# Patient Record
Sex: FEMALE | Race: WHITE | NOT HISPANIC OR LATINO | Employment: FULL TIME | URBAN - METROPOLITAN AREA
[De-identification: names, ages, dates, MRNs, and addresses within clinical notes are randomized per-mention and may not be internally consistent; named-entity substitution may affect disease eponyms.]

---

## 2017-01-04 ENCOUNTER — OFFICE VISIT (OUTPATIENT)
Dept: OBGYN | Facility: CLINIC | Age: 32
End: 2017-01-04
Payer: COMMERCIAL

## 2017-01-04 VITALS
SYSTOLIC BLOOD PRESSURE: 116 MMHG | BODY MASS INDEX: 30.76 KG/M2 | HEIGHT: 67 IN | DIASTOLIC BLOOD PRESSURE: 82 MMHG | HEART RATE: 70 BPM | WEIGHT: 196 LBS

## 2017-01-04 DIAGNOSIS — Z97.5 PRESENCE OF INTRAUTERINE CONTRACEPTIVE DEVICE: ICD-10-CM

## 2017-01-04 DIAGNOSIS — E66.811 OBESITY (BMI 30.0-34.9): ICD-10-CM

## 2017-01-04 DIAGNOSIS — R53.82 CHRONIC FATIGUE: ICD-10-CM

## 2017-01-04 DIAGNOSIS — Z01.419 ENCOUNTER FOR GYNECOLOGICAL EXAMINATION WITHOUT ABNORMAL FINDING: Primary | ICD-10-CM

## 2017-01-04 PROCEDURE — 99395 PREV VISIT EST AGE 18-39: CPT | Performed by: OBSTETRICS & GYNECOLOGY

## 2017-01-04 RX ORDER — PHENTERMINE HYDROCHLORIDE 37.5 MG/1
37.5 CAPSULE ORAL EVERY MORNING
Qty: 90 CAPSULE | Refills: 0 | Status: SHIPPED | OUTPATIENT
Start: 2017-01-04 | End: 2017-01-04

## 2017-01-04 RX ORDER — PHENTERMINE HYDROCHLORIDE 37.5 MG/1
37.5 CAPSULE ORAL EVERY MORNING
Qty: 90 CAPSULE | Refills: 0 | Status: SHIPPED | OUTPATIENT
Start: 2017-01-04 | End: 2017-04-08

## 2017-01-04 ASSESSMENT — PATIENT HEALTH QUESTIONNAIRE - PHQ9: 5. POOR APPETITE OR OVEREATING: NOT AT ALL

## 2017-01-04 ASSESSMENT — ANXIETY QUESTIONNAIRES
5. BEING SO RESTLESS THAT IT IS HARD TO SIT STILL: NOT AT ALL
7. FEELING AFRAID AS IF SOMETHING AWFUL MIGHT HAPPEN: NOT AT ALL
6. BECOMING EASILY ANNOYED OR IRRITABLE: NOT AT ALL
GAD7 TOTAL SCORE: 0
1. FEELING NERVOUS, ANXIOUS, OR ON EDGE: NOT AT ALL
2. NOT BEING ABLE TO STOP OR CONTROL WORRYING: NOT AT ALL
IF YOU CHECKED OFF ANY PROBLEMS ON THIS QUESTIONNAIRE, HOW DIFFICULT HAVE THESE PROBLEMS MADE IT FOR YOU TO DO YOUR WORK, TAKE CARE OF THINGS AT HOME, OR GET ALONG WITH OTHER PEOPLE: NOT DIFFICULT AT ALL
3. WORRYING TOO MUCH ABOUT DIFFERENT THINGS: NOT AT ALL

## 2017-01-04 NOTE — MR AVS SNAPSHOT
"              After Visit Summary   1/4/2017    Marivel Jones    MRN: [id number]           Patient Information     Date Of Birth          1985        Visit Information        Provider Department      1/4/2017 1:30 PM Karen Ngo MD [facility]        Today's Diagnoses     Encounter for gynecological examination without abnormal finding [Z01.419]    -  1     Obesity (BMI 30.0-34.9)            Follow-ups after your visit        Your next 10 appointments already scheduled     Apr 03, 2017  1:00 PM   Office Visit with Karen Ngo MD   [facility] ([facility]    [address] [place] [id number] [phone]           Bring a current list of meds and any records pertaining to this visit.  For Physicals, please bring immunization records and any forms needing to be filled out.  Please arrive 10 minutes early to complete paperwork.              Who to contact     If you have questions or need follow up information about today's clinic visit or your schedule please contact [facility] directly at [phone].  Normal or non-critical lab and imaging results will be communicated to you by PatientKeeperhart, letter or phone within 4 business days after the clinic has received the results. If you do not hear from us within 7 days, please contact the clinic through [employer] or phone. If you have a critical or abnormal lab result, we will notify you by phone as soon as possible.  Submit refill requests through [employer] or call your pharmacy and they will forward the refill request to us. Please allow 3 business days for your refill to be completed.          Additional Information About Your Visit        PatientKeeperharBlastbeat Information     [employer] lets you send messages to your doctor, view your test results, renew your prescriptions, schedule appointments and more. To sign up, go to [email or web address] . Click on \"Log " "in\" on the left side of the screen, which will take you to the Welcome page. Then click on \"Sign up Now\" on the right side of the page.     You will be asked to enter the access code listed below, as well as some personal information. Please follow the directions to create your username and password.     Your access code is: [email or web address]  Expires: 2017  2:41 PM     Your access code will  in 90 days. If you need help or a new code, please call your [place] clinic or [phone].        Care EveryWhere ID     This is your Care EveryWhere ID. This could be used by other organizations to access your [facility] medical records  [id number]        Your Vitals Were     Pulse Height BMI (Body Mass Index)             70 5' 6.5\" (1.689 m) 31.16 kg/m2          Blood Pressure from Last 3 Encounters:   17 116/82   16 122/76   16 120/76    Weight from Last 3 Encounters:   17 196 lb (88.905 kg)   16 201 lb (91.173 kg)   16 208 lb (94.348 kg)              Today, you had the following     No orders found for display         Today's Medication Changes          These changes are accurate as of: 17  2:41 PM.  If you have any questions, ask your nurse or doctor.               Start taking these medicines.        Dose/Directions    phentermine 37.5 MG capsule   Used for:  Obesity (BMI 30.0-34.9)   Started by:  Karen Ngo MD        Dose:  37.5 mg   Take 1 capsule (37.5 mg) by mouth every morning   Quantity:  90 capsule   Refills:  0            Where to get your medicines      Some of these will need a paper prescription and others can be bought over the counter.  Ask your nurse if you have questions.     Bring a paper prescription for each of these medications    - phentermine 37.5 MG capsule             Primary Care Provider Office Phone # Fax #    Karen Ngo [phone]       [facility] [address]  [facility] [address]        Thank you!  "    Thank you for choosing [facility]  for your care. Our goal is always to provide you with excellent care. Hearing back from our patients is one way we can continue to improve our services. Please take a few minutes to complete the written survey that you may receive in the mail after your visit with us. Thank you!             Your Updated Medication List - Protect others around you: Learn how to safely use, store and throw away your medicines at www.disposemymeds.org.          This list is accurate as of: 1/4/17  2:41 PM.  Always use your most recent med list.                   Brand Name Dispense Instructions for use    levonorgestrel 20 MCG/24HR IUD    MIRENA    1 each    1 each (20 mcg) by Intrauterine route once       phentermine 37.5 MG capsule     90 capsule    Take 1 capsule (37.5 mg) by mouth every morning

## 2017-01-04 NOTE — PROGRESS NOTES
Marivel is a 31 year old  female who presents for annual exam.     Besides routine health maintenance,  she would like to discuss phentermine.    HPI:  Patient is doing really well. Has been working on weight loss for about a year now and on phentermine that time. Tried to wean off once and not b/c of weight gain but more b/c of chronic fatigue we decided to keep her on it. She is down about 40# or so and feeling great. She was admittedly walking and exercising more a couple months ago and the cold weather and holidays derailed her. However is still eating small portions and healthy choices 90% of the time.  Feels like a whole new person. Focus is great, energy is great, mood is great. Not feeling any side effects and still feels the effects and not plateuing. Really would like to stay on a bit longer yet b/c so worried about her chronic fatigue coming back b/c had it even when had lower weight.  IUD is working great. Hasn't had a period in a couple years until just 2 days ago started spotting a little. Boys are 5 and 3 and really would like one more.  is warming up to the idea. Things she'd like to try in spring. Wondering when to remove IUD if they move forward.      GYNECOLOGIC HISTORY:    No LMP recorded. Patient is not currently having periods (Reason: IUD).  Her current contraception method is: IUD.  She  reports that she has never smoked. She does not have any smokeless tobacco history on file.    Patient is sexually active.  STD testing offered?  Declined  Last PHQ-9 score on record =   PHQ-9 SCORE 2017   Total Score 0     Last GAD7 score on record =   NATACHA-7 SCORE 2017   Total Score 0     Alcohol Score = 1    HEALTH MAINTENANCE:  Cholesterol: 06/19/15   Total= 159, Triglycerides=66, HDL=56, LDL=90, FBS=87, TSH=06/09/15 2.23  Last Mammo: Never, Result: not applicable  Pap: 14 wnl, HPV-  Colonoscopy:  Never, Result: not applicable  Dexa:  Never    Health maintenance updated:   "yes    HISTORY:  Obstetric History       T2      TAB0   SAB0   E0   M0   L2       # Outcome Date GA Lbr Efren/2nd Weight Sex Delivery Anes PTL Lv   2 Term 13 39w3d 02:20 / 01:00 8 lb 14.5 oz (4.04 kg) M Vag-Spont EPI  Y      Name: Case      Apgar1:  9                Apgar5: 9   1 Term 11 39w0d  8 lb 7 oz (3.827 kg) M Vag-Vacuum EPI  Y      Name: Pina          Patient Active Problem List   Diagnosis     Fatigue     Vitamin D deficiency     Morbid obesity, unspecified obesity type (H)     Presence of intrauterine contraceptive device     Obesity (BMI 30.0-34.9)     Past Surgical History   Procedure Laterality Date     [place] teeth        Social History   Substance Use Topics     Smoking status: Never Smoker      Smokeless tobacco: Not on file     Alcohol Use: 0.0 oz/week     0 Standard drinks or equivalent per week      Problem (# of Occurrences) Relation (Name,Age of Onset)    Thyroid Disease (1) Maternal Grandmother            Current Outpatient Prescriptions   Medication Sig     phentermine 37.5 MG capsule Take 1 capsule (37.5 mg) by mouth every morning     levonorgestrel (MIRENA) 20 MCG/24HR IUD 1 each (20 mcg) by Intrauterine route once     No current facility-administered medications for this visit.     No Known Allergies    Past medical, surgical, social and family histories were reviewed and updated in EPIC.    ROS:   12 point review of systems negative other than symptoms noted below.  Musculoskeletal: Joint Pain    EXAM:  /82 mmHg  Pulse 70  Ht 5' 6.5\" (1.689 m)  Wt 196 lb (88.905 kg)  BMI 31.16 kg/m2   BMI: Body mass index is 31.16 kg/(m^2).    PHYSICAL EXAM:  Constitutional:  Appearance: Well nourished, well developed, alert, in no acute distress  Neck:  Lymph Nodes:  No lymphadenopathy present    Thyroid:  Gland size normal, nontender, no nodules or masses present  on palpation  Chest:  Respiratory Effort:  Breathing unlabored  Cardiovascular:    Heart: Auscultation: "  Regular rate, normal rhythm, no murmurs present  Breasts: Inspection of Breasts:  No lymphadenopathy present    Palpation of Breasts and Axillae:  No masses present on palpation, no  breast tenderness    Axillary Lymph Nodes:  No lymphadenopathy present  Gastrointestinal:   Abdominal Examination:  Abdomen nontender to palpation, tone normal without rigidity or guarding, no masses present, umbilicus without lesions   Liver and Spleen:  No hepatomegaly present, liver nontender to palpation    Hernias:  No hernias present  Lymphatic: Lymph Nodes:  No other lymphadenopathy present  Skin:  General Inspection:  No rashes present, no lesions present, no areas of  discoloration    Genitalia and Groin:  No rashes present, no lesions present, no areas of  discoloration, no masses present  Neurologic/Psychiatric:    Mental Status:  Oriented X3     Pelvic Exam:  External Genitalia:     Normal appearance for age, no discharge present, no tenderness present, no inflammatory lesions present, color normal  Vagina:    Normal vaginal vault without central or paravaginal defects, no discharge present, no inflammatory lesions present, no masses present  Bladder:     Nontender to palpation  Urethra:   Urethral Body:  Urethra palpation normal, urethra structural support normal   Urethral Meatus:  No erythema or lesions present  Cervix:     Appearance healthy, no lesions present, nontender to palpation, no bleeding present, string present  Uterus:     Nontender to palpation, no masses present, position anteflexed, mobility: normal  Adnexa:     No adnexal tenderness present, no adnexal masses present  Perineum:     Perineum within normal limits, no evidence of trauma, no rashes or skin lesions present  Anus:     Anus within normal limits, no hemorrhoids present  Inguinal Lymph Nodes:     No lymphadenopathy present  Pubic Hair:     Normal pubic hair distribution for age  Genitalia and Groin:     No rashes present, no lesions present, no  areas of discoloration, no masses present    COUNSELING:   Reviewed preventive health counseling, as reflected in patient instructions  Special attention given to:        Regular exercise       Healthy diet/nutrition       Family planning    BMI: Body mass index is 31.16 kg/(m^2).  Weight management plan: Discussed healthy diet and exercise guidelines and patient will follow up in 3 months in clinic to re-evaluate.    ASSESSMENT:  31 year old female with satisfactory annual exam.    ICD-10-CM    1. Encounter for gynecological examination without abnormal finding [Z01.419] Z01.419    2. Obesity (BMI 30.0-34.9) E66.9 phentermine 37.5 MG capsule     DISCONTINUED: phentermine 37.5 MG capsule   3. Presence of intrauterine contraceptive device Z97.5    4. Chronic fatigue R53.82        PLAN:  Pap is UTD  Will return in 3 months to f/u on phentermine and likely plan to remove IUD at that time as well  Should start PNV  Encouraged to increase exercise b/c will need to stop phentermine to get pregnant and if not doing the right things will be high risk to gain it all back and patient very much aware of that.    Karen Ngo MD

## 2017-01-05 ASSESSMENT — ANXIETY QUESTIONNAIRES: GAD7 TOTAL SCORE: 0

## 2017-01-05 ASSESSMENT — PATIENT HEALTH QUESTIONNAIRE - PHQ9: SUM OF ALL RESPONSES TO PHQ QUESTIONS 1-9: 0

## 2017-04-03 ENCOUNTER — OFFICE VISIT (OUTPATIENT)
Dept: OBGYN | Facility: CLINIC | Age: 32
End: 2017-04-03
Payer: COMMERCIAL

## 2017-04-03 VITALS
HEART RATE: 84 BPM | DIASTOLIC BLOOD PRESSURE: 80 MMHG | SYSTOLIC BLOOD PRESSURE: 120 MMHG | WEIGHT: 188 LBS | BODY MASS INDEX: 29.89 KG/M2

## 2017-04-03 DIAGNOSIS — Z30.432 ENCOUNTER FOR IUD REMOVAL: ICD-10-CM

## 2017-04-03 DIAGNOSIS — E66.3 OVERWEIGHT (BMI 25.0-29.9): ICD-10-CM

## 2017-04-03 DIAGNOSIS — Z31.9 PROCREATIVE MANAGEMENT: Primary | ICD-10-CM

## 2017-04-03 PROCEDURE — 58301 REMOVE INTRAUTERINE DEVICE: CPT | Performed by: OBSTETRICS & GYNECOLOGY

## 2017-04-03 PROCEDURE — 99213 OFFICE O/P EST LOW 20 MIN: CPT | Mod: 25 | Performed by: OBSTETRICS & GYNECOLOGY

## 2017-04-03 NOTE — PROGRESS NOTES
SUBJECTIVE:                                                   Marivel Jones is a 31 year old female who presents to clinic today for the following health issue(s):  Patient presents with:  Recheck Medication          HPI:  Patient was here to f/u on her phentermine. Has been on it for several months with excellent results. Has lost almost 50#. Energy is great. Feeling great, eating healthy, walking and drinking tons of water. No side effects. Really loves how it working and really nervous to stop it but at same time thinks she's ready to get pregnant. Boys are 5 and 3 and really feels like she'd like to move forward. Hates pregnancy and the early baby phase but also just wants to move forward and take out her IUD.  isn't totally on board but knows that he'll go along with her at the end of the day. Really nervous. concieved Kj while on ocps so removing the IUD just seems very real.  Wondering when to stop phentermine, if she should wean off, etc.  Is doing a 37.5mg capsule.    No LMP recorded. Patient is not currently having periods (Reason: IUD)..   Patient is sexually active, .  Using IUD for contraception.    reports that she has never smoked. She does not have any smokeless tobacco history on file.    STD testing offered?  Declined    Health maintenance updated:  no    Today's PHQ-2 Score: No flowsheet data found.  Today's PHQ-9 Score:   PHQ-9 SCORE 2017   Total Score 0     Today's NATACHA-7 Score:   NATACHA-7 SCORE 2017   Total Score 0       Problem list and histories reviewed & adjusted, as indicated.  Additional history: as documented.    Patient Active Problem List   Diagnosis     Fatigue     Vitamin D deficiency     Morbid obesity, unspecified obesity type (H)     Obesity (BMI 30.0-34.9)     Past Surgical History:   Procedure Laterality Date     [place] teeth  2012      Social History   Substance Use Topics     Smoking status: Never Smoker     Smokeless tobacco: Not on file      Alcohol use 0.0 oz/week     0 Standard drinks or equivalent per week      Problem (# of Occurrences) Relation (Name,Age of Onset)    Thyroid Disease (1) Maternal Grandmother            Current Outpatient Prescriptions   Medication Sig     phentermine 37.5 MG capsule Take 1 capsule (37.5 mg) by mouth every morning     levonorgestrel (MIRENA) 20 MCG/24HR IUD 1 each (20 mcg) by Intrauterine route once     No current facility-administered medications for this visit.      No Known Allergies    ROS:  12 point review of systems negative other than symptoms noted below.    OBJECTIVE:     /80  Pulse 84  Wt 188 lb (85.3 kg)  BMI 29.89 kg/m2  Body mass index is 29.89 kg/(m^2).    Exam:  Constitutional:  Appearance: Well nourished, well developed alert, in no acute distress  Chest:  Respiratory Effort:  Breathing unlabored  Cardiovascular: Heart: Auscultation:  Regular rate, normal rhythm, no murmurs present  Pelvic Exam:  External Genitalia:     Normal appearance for age, no discharge present, no tenderness present, no inflammatory lesions present, color normal  Vagina:    Normal vaginal vault without central or paravaginal defects, no discharge present, no inflammatory lesions present, no masses present  Bladder:     Nontender to palpation  Urethra:   Urethral Body:  Urethra palpation normal, urethra structural support normal   Urethral Meatus:  No erythema or lesions present  Cervix:     Appearance healthy, no lesions present, nontender to palpation, no bleeding present, string present  Uterus:     Nontender to palpation, no masses present, position anteflexed, mobility: normal  Adnexa:     No adnexal tenderness present, no adnexal masses present  Perineum:     Perineum within normal limits, no evidence of trauma, no rashes or skin lesions present  Anus:     Anus within normal limits, no hemorrhoids present  Inguinal Lymph Nodes:     No lymphadenopathy present  Pubic Hair:     Normal pubic hair distribution for  age  Genitalia and Groin:     No rashes present, no lesions present, no areas of discoloration, no masses present     In-Clinic Test Results:  No results found for this or any previous visit (from the past 24 hour(s)).    ASSESSMENT/PLAN:                                                        ICD-10-CM    1. Procreative management Z31.9    2. Encounter for IUD removal Z30.432 REMOVE INTRAUTERINE DEVICE   3. Overweight (BMI 25.0-29.9) E66.3          Patient has done great on phentermine and has had continued weight loss while on it and improvement in her day to day patterns and habits to help sustain the loss.  Patient now wants to conceive so needs to stop the medication as not considered safe. Recommended to do every other day for 1 week then every 3 days for one week and then off.  Start PNV.  IUD removed as per procedure note below.    Karen Ngo MD  [facility]  INDICATIONS:                                                      Is a pregnancy test required: No.  Was a consent obtained?  Yes    Marivel Jones is a 31 year old female,, No LMP recorded. Patient is not currently having periods (Reason: IUD). who presents today for IUD removal. Her current IUD was placed 3 yrs ago. She has not had problems with the IUD. She requests removal of the IUD because she desires to conceive    Today's PHQ-2 Score: No flowsheet data found.    PROCEDURE:                                                      A speculum exam was performed and the cervix was visualized. The IUD string was visualized. Using ring forceps, the string  was grasped and the IUD removed intact.    POST PROCEDURE:                                                      The patient tolerated the procedure well. Patient was discharged in stable condition.    Call if bleeding, pain or fever occur. and Pregnancy counseling given, including folic acid supplementation 800-1000 mg per day.    Karen Ngo MD

## 2017-04-03 NOTE — MR AVS SNAPSHOT
"              After Visit Summary   4/3/2017    Marivel Jones    MRN: [id number]           Patient Information     Date Of Birth          1985        Visit Information        Provider Department      4/3/2017 1:00 PM Karen Ngo MD [facility]         Follow-ups after your visit        Who to contact     If you have questions or need follow up information about today's clinic visit or your schedule please contact [facility] directly at [phone].  Normal or non-critical lab and imaging results will be communicated to you by MyChart, letter or phone within 4 business days after the clinic has received the results. If you do not hear from us within 7 days, please contact the clinic through [employer] or phone. If you have a critical or abnormal lab result, we will notify you by phone as soon as possible.  Submit refill requests through [employer] or call your pharmacy and they will forward the refill request to us. Please allow 3 business days for your refill to be completed.          Additional Information About Your Visit        MyChart Information     [employer] lets you send messages to your doctor, view your test results, renew your prescriptions, schedule appointments and more. To sign up, go to [email or web address] . Click on \"Log in\" on the left side of the screen, which will take you to the Welcome page. Then click on \"Sign up Now\" on the right side of the page.     You will be asked to enter the access code listed below, as well as some personal information. Please follow the directions to create your username and password.     Your access code is: [email or web address]  Expires: 2017  3:41 PM     Your access code will  in 90 days. If you need help or a new code, please call your [facility] clinic or [phone].        Care EveryWhere ID     This is your Care EveryWhere ID. This could be used by other organizations to access your [facility] medical " records  [id number]        Your Vitals Were     Pulse BMI (Body Mass Index)                84 29.89 kg/m2           Blood Pressure from Last 3 Encounters:   04/03/17 120/80   01/04/17 116/82   09/26/16 122/76    Weight from Last 3 Encounters:   04/03/17 188 lb (85.3 kg)   01/04/17 196 lb (88.9 kg)   09/26/16 201 lb (91.2 kg)              Today, you had the following     No orders found for display       Primary Care Provider Office Phone # Fax #    Karen Ngo [phone]       [facility] [address] [address]        Thank you!     Thank you for choosing [facility]  for your care. Our goal is always to provide you with excellent care. Hearing back from our patients is one way we can continue to improve our services. Please take a few minutes to complete the written survey that you may receive in the mail after your visit with us. Thank you!             Your Updated Medication List - Protect others around you: Learn how to safely use, store and throw away your medicines at www.disposemymeds.org.          This list is accurate as of: 4/3/17  1:42 PM.  Always use your most recent med list.                   Brand Name Dispense Instructions for use    levonorgestrel 20 MCG/24HR IUD    MIRENA    1 each    1 each (20 mcg) by Intrauterine route once       phentermine 37.5 MG capsule     90 capsule    Take 1 capsule (37.5 mg) by mouth every morning

## 2018-01-26 ENCOUNTER — OFFICE VISIT (OUTPATIENT)
Dept: OBGYN | Facility: CLINIC | Age: 33
End: 2018-01-26
Payer: COMMERCIAL

## 2018-01-26 VITALS
SYSTOLIC BLOOD PRESSURE: 112 MMHG | WEIGHT: 198 LBS | BODY MASS INDEX: 31.08 KG/M2 | HEIGHT: 67 IN | DIASTOLIC BLOOD PRESSURE: 66 MMHG

## 2018-01-26 DIAGNOSIS — Z31.41 FERTILITY TESTING: ICD-10-CM

## 2018-01-26 DIAGNOSIS — Z31.9 PROCREATIVE MANAGEMENT: ICD-10-CM

## 2018-01-26 DIAGNOSIS — E66.811 OBESITY (BMI 30.0-34.9): ICD-10-CM

## 2018-01-26 DIAGNOSIS — Z01.419 ENCOUNTER FOR GYNECOLOGICAL EXAMINATION WITHOUT ABNORMAL FINDING: Primary | ICD-10-CM

## 2018-01-26 PROCEDURE — 99395 PREV VISIT EST AGE 18-39: CPT | Performed by: OBSTETRICS & GYNECOLOGY

## 2018-01-26 ASSESSMENT — ANXIETY QUESTIONNAIRES
2. NOT BEING ABLE TO STOP OR CONTROL WORRYING: NOT AT ALL
IF YOU CHECKED OFF ANY PROBLEMS ON THIS QUESTIONNAIRE, HOW DIFFICULT HAVE THESE PROBLEMS MADE IT FOR YOU TO DO YOUR WORK, TAKE CARE OF THINGS AT HOME, OR GET ALONG WITH OTHER PEOPLE: NOT DIFFICULT AT ALL
6. BECOMING EASILY ANNOYED OR IRRITABLE: NOT AT ALL
1. FEELING NERVOUS, ANXIOUS, OR ON EDGE: NOT AT ALL
7. FEELING AFRAID AS IF SOMETHING AWFUL MIGHT HAPPEN: NOT AT ALL
GAD7 TOTAL SCORE: 0
5. BEING SO RESTLESS THAT IT IS HARD TO SIT STILL: NOT AT ALL
3. WORRYING TOO MUCH ABOUT DIFFERENT THINGS: NOT AT ALL

## 2018-01-26 ASSESSMENT — PATIENT HEALTH QUESTIONNAIRE - PHQ9: 5. POOR APPETITE OR OVEREATING: NOT AT ALL

## 2018-01-26 NOTE — MR AVS SNAPSHOT
"              After Visit Summary   1/26/2018    Marivel Jones    MRN: [id number]           Patient Information     Date Of Birth          1985        Visit Information        Provider Department      1/26/2018 1:00 PM Karen Ngo MD [facility]        Today's Diagnoses     Encounter for gynecological examination without abnormal finding    -  1    Procreative management        Fertility testing        Obesity (BMI 30.0-34.9)           Follow-ups after your visit        Future tests that were ordered for you today     Open Future Orders        Priority Expected Expires Ordered    Anti-Mullerian hormone Routine  2/26/2018 1/26/2018    Follicle stimulating hormone Routine  2/26/2018 1/26/2018    Estradiol Routine  2/26/2018 1/26/2018    Prolactin Routine  2/26/2018 1/26/2018    TSH with free T4 reflex Routine  2/26/2018 1/26/2018            Who to contact     If you have questions or need follow up information about today's clinic visit or your schedule please contact [facility] directly at [phone].  Normal or non-critical lab and imaging results will be communicated to you by Scope 5hart, letter or phone within 4 business days after the clinic has received the results. If you do not hear from us within 7 days, please contact the clinic through Thumbs Upt or phone. If you have a critical or abnormal lab result, we will notify you by phone as soon as possible.  Submit refill requests through [email or web address] or call your pharmacy and they will forward the refill request to us. Please allow 3 business days for your refill to be completed.          Additional Information About Your Visit        Scope 5hart Information     [email or web address] lets you send messages to your doctor, view your test results, renew your prescriptions, schedule appointments and more. To sign up, go to [email or web address] . Click on \"Log in\" on the left side of the screen, which will take you to the " "Welcome page. Then click on \"Sign up Now\" on the right side of the page.     You will be asked to enter the access code listed below, as well as some personal information. Please follow the directions to create your username and password.     Your access code is: [email or web address]  Expires: 2018  6:37 PM     Your access code will  in 90 days. If you need help or a new code, please call your [place] clinic or [phone].        Care EveryWhere ID     This is your Care EveryWhere ID. This could be used by other organizations to access your [place] medical records  [id number]        Your Vitals Were     Height Last Period BMI (Body Mass Index)             5' 6.5\" (1.689 m) 2018 31.48 kg/m2          Blood Pressure from Last 3 Encounters:   18 112/66   17 120/80   17 116/82    Weight from Last 3 Encounters:   18 198 lb (89.8 kg)   17 188 lb (85.3 kg)   17 196 lb (88.9 kg)               Primary Care Provider Office Phone # Fax #    Karen Ngo [phone] [address] [address]        Equal Access to Services     [facility] AH: Hadii aad ku hadasho Soreginaldali, waaxda luqadaha, qaybta kaalmada adeegyada, wendi irvin . So [facility] [phone].    ATENCIÓN: Si habla español, tiene a saba disposición servicios gratuitos de asistencia lingüística. Llame al [phone].    We comply with applicable federal civil rights laws and [place] laws. We do not discriminate on the basis of race, color, national origin, age, disability, sex, sexual orientation, or gender identity.            Thank you!     Thank you for choosing [facility]  for your care. Our goal is always to provide you with excellent care. Hearing back from our patients is one way we can continue to improve our services. Please take a few minutes to complete the written survey that you may receive in the mail after your visit " with us. Thank you!             Your Updated Medication List - Protect others around you: Learn how to safely use, store and throw away your medicines at www.disposemymeds.org.      Notice  As of 1/26/2018  6:37 PM    You have not been prescribed any medications.

## 2018-01-26 NOTE — PROGRESS NOTES
"  Marivel is a 32 year old  female who presents for annual exam.     Besides routine health maintenance, has been trying to get pregnant since IUD removed in April.       HPI:  Patient is doing well. Stopped her phentermine in the summer and was really sad to stop it. Felt great even off of it until about 3 months ago. Then started to feel more fatigued. Still trying to be really careful on what she's eating and portion size. Exercising some. Gained about 10# in the last 9 months since stopping it but still about 40# down from her max and hoping to get working on it more agressively now that holidays done    Still working for [employer].     Boys are 6 and 4.  going well for Pina. Got pregnant with him \"by accident\" and with case did an opk test one time and got pregnant. Now trying since IUD came out in April. Periods are very regular at 27-28 days. Can tell when ovulating and thinks its' about day 13 of cycle. More actively trying since /july but not doing OPKs at all but more cautious of timing I.c appropriately. Is not surprised it's taking longer b/c older but disappointed. May just give herself until turns 33 and if not preg by then may just stop trying at that point.    No other concerns or issues.    The patient does not have a primary care clinic.      GYNECOLOGIC HISTORY:    Patient's last menstrual period was 2018.  Her current contraception method is: none.  She  reports that she has never smoked. She has never used smokeless tobacco.  Patient is sexually active.  STD testing offered?  Declined     Last PHQ-9 score on record =   PHQ-9 SCORE 2018   Total Score 0     Last GAD7 score on record =   NATACHA-7 SCORE 2018   Total Score 0     Alcohol Score = 2    HEALTH MAINTENANCE:  Cholesterol: Triglycerides=66, HDL=56, LDL=90, FBS=87, TSH=2.23  Cholesterol   Date Value Ref Range Status   2015 159 <200 mg/dL Final     Comment:     LDL Cholesterol is the primary guide to " "therapy.   The NCEP recommends further evaluation of: patients with cholesterol greater   than 200 mg/dL if additional risk factors are present, cholesterol greater   than   240 mg/dL, triglycerides greater than 150 mg/dL, or HDL less than 40 mg/dL.     Last Mammo: N/A, Result: not applicable, Next Mammo: due age 38  Pap: 14  Negative, Neg-HPV  Colonoscopy:  N/A, Result: not applicable, Next Colonoscopy: due age 48  Dexa:  Never  Health maintenance updated:  yes    HISTORY:  Obstetric History       T2      L2     SAB0   TAB0   Ectopic0   Multiple0   Live Births2       # Outcome Date GA Lbr Efren/2nd Weight Sex Delivery Anes PTL Lv   2 Term 13 39w3d 02:20 / 01:00 8 lb 14.5 oz (4.04 kg) M Vag-Spont EPI  CHANDNI      Name: Case      Apgar1:  9                Apgar5: 9   1 Term 11 39w0d  8 lb 7 oz (3.827 kg) M Vag-Vacuum EPI  CHANDNI      Name: Pina          Patient Active Problem List   Diagnosis     Fatigue     Vitamin D deficiency     Morbid obesity, unspecified obesity type (H)     Obesity (BMI 30.0-34.9)     Past Surgical History:   Procedure Laterality Date     wisdom teeth  2012      Social History   Substance Use Topics     Smoking status: Never Smoker     Smokeless tobacco: Never Used     Alcohol use 0.0 oz/week     0 Standard drinks or equivalent per week      Problem (# of Occurrences) Relation (Name,Age of Onset)    Thyroid Disease (1) Maternal Grandmother            No current outpatient prescriptions on file.     No current facility-administered medications for this visit.      No Known Allergies    Past medical, surgical, social and family histories were reviewed and updated in EPIC.    ROS:   12 point review of systems negative other than symptoms noted below.    EXAM:  /66  Ht 5' 6.5\" (1.689 m)  Wt 198 lb (89.8 kg)  LMP 2018  BMI 31.48 kg/m2   BMI: Body mass index is 31.48 kg/(m^2).    PHYSICAL EXAM:  Constitutional:  Appearance: Well nourished, well developed, " alert, in no acute distress  Neck:  Lymph Nodes:  No lymphadenopathy present    Thyroid:  Gland size normal, nontender, no nodules or masses present  on palpation  Chest:  Respiratory Effort:  Breathing unlabored  Cardiovascular:    Heart: Auscultation:  Regular rate, normal rhythm, no murmurs present  Breasts: Palpation of Breasts and Axillae:  No masses present on palpation, no breast tenderness. and No nodularity, asymmetry or nipple discharge bilaterally.  Gastrointestinal:   Abdominal Examination:  Abdomen nontender to palpation, tone normal without rigidity or guarding, no masses present, umbilicus without lesions   Liver and Spleen:  No hepatomegaly present, liver nontender to palpation    Hernias:  No hernias present  Lymphatic: Lymph Nodes:  No other lymphadenopathy present  Skin:  General Inspection:  No rashes present, no lesions present, no areas of  discoloration    Genitalia and Groin:  No rashes present, no lesions present, no areas of  discoloration, no masses present  Neurologic/Psychiatric:    Mental Status:  Oriented X3     Pelvic Exam:  External Genitalia:     Normal appearance for age, no discharge present, no tenderness present, no inflammatory lesions present, color normal  Vagina:     Normal vaginal vault without central or paravaginal defects, no discharge present, no inflammatory lesions present, no masses present  Bladder:     Nontender to palpation  Urethra:   Urethral Body:  Urethra palpation normal, urethra structural support normal   Urethral Meatus:  No erythema or lesions present  Cervix:     Appearance healthy, no lesions present, nontender to palpation, no bleeding present  Uterus:     Uterus: firm, normal sized and nontender, anteverted in position.   Adnexa:     No adnexal tenderness present, no adnexal masses present  Perineum:     Perineum within normal limits, no evidence of trauma, no rashes or skin lesions present  Anus:     Anus within normal limits, no hemorrhoids  present  Inguinal Lymph Nodes:     No lymphadenopathy present  Pubic Hair:     Normal pubic hair distribution for age  Genitalia and Groin:     No rashes present, no lesions present, no areas of discoloration, no masses present      COUNSELING:   Reviewed preventive health counseling, as reflected in patient instructions  Special attention given to:        Regular exercise       Healthy diet/nutrition       Family planning    BMI: Body mass index is 31.48 kg/(m^2).  Weight management plan: Discussed healthy diet and exercise guidelines and patient will follow up in 12 months in clinic to re-evaluate.    ASSESSMENT:  32 year old female with satisfactory annual exam.    ICD-10-CM    1. Encounter for gynecological examination without abnormal finding Z01.419    2. Procreative management Z31.9    3. Fertility testing Z31.41 Anti-Mullerian hormone     Follicle stimulating hormone     Estradiol     Prolactin     TSH with free T4 reflex   4. Obesity (BMI 30.0-34.9) E66.9        PLAN:  Pap Is UTD for 2 more years  Patient isn't ready to pursue fertility testing at this point. Will do OPKs again just to make sure that timing it right. Discussed day 3 labs vs just AMH level and she will think about it and if decides to do labs will call to schedule  Discussed weight, portions, exercise, low carbs, etc.    Karen Ngo MD

## 2018-01-27 ASSESSMENT — PATIENT HEALTH QUESTIONNAIRE - PHQ9: SUM OF ALL RESPONSES TO PHQ QUESTIONS 1-9: 0

## 2018-01-27 ASSESSMENT — ANXIETY QUESTIONNAIRES: GAD7 TOTAL SCORE: 0

## 2018-03-26 ENCOUNTER — TELEPHONE (OUTPATIENT)
Dept: OBGYN | Facility: CLINIC | Age: 33
End: 2018-03-26

## 2018-03-26 NOTE — TELEPHONE ENCOUNTER
1st attempt: LMTCB to check if pt is planning on doing fertility testing per AJ last note. Asked pt to call back and let me know if she declines having any testing done.    Extended out labs 2 months just to follow up if pt does not return call

## 2018-03-28 NOTE — TELEPHONE ENCOUNTER
Cancelled labs per pt.    Labs that were ordered were TSH with reflex, Prolactin, Estradiol, FSH, and AMH if pt decides to have done in future.

## 2018-05-15 ENCOUNTER — TELEPHONE (OUTPATIENT)
Dept: NURSING | Facility: CLINIC | Age: 33
End: 2018-05-15

## 2018-05-15 DIAGNOSIS — Z31.41 FERTILITY TESTING: Primary | ICD-10-CM

## 2018-05-15 NOTE — TELEPHONE ENCOUNTER
She can also always do day 3 labs on day 2-5 if that helps avoid needing labs at the hospital when clinics are closed.  Past day 5 isn't as accurate anymore

## 2018-05-15 NOTE — TELEPHONE ENCOUNTER
Called pt to notify that days 2-5 for the labs will work just as well per note from Dr. Ngo below. Pt verbalized understanding and has no other questions at this time.

## 2018-05-15 NOTE — TELEPHONE ENCOUNTER
"I would definitely do all labs on day 3. First day of \"period\" is first day of true red blood. Anything that is brown or black spotting before that doesn't count. Actual red flow. Then we can do tsh, prl, estradiol, fsh, and AMH. Are her periods regular? If they are then I bet she is ovulating and her OPK is just not accurate.  "

## 2018-05-15 NOTE — TELEPHONE ENCOUNTER
"Pt's last office visit 1/26/2018 and discussed with Dr. Ngo of possibly pursuing fertility testing although she wasn't ready at that time but now \"thinking\" about proceeding.  She has been doing OPK's at home and they are showing that she isn't ovulating so this is concerning to her, especially with her age. She isn't sure how to time day 3 labs, as she is never sure when to count the start of her period with spotting at the beginning.   She really wants your opinion on where to start. If there are labs she can do at anytime during her cycle.    Routing to Dr. Ngo, please advise. She is aware we may not get back to her until tomorrow knowing it's your surgery day.    Thank you!    Anum LUNA RN  Triage  "

## 2018-05-15 NOTE — TELEPHONE ENCOUNTER
"Pt calling in,   Reviewed notes with Dr. Ngo' recommendations below  LMP 4/29, periods ARE regular (reviewed that Dr. Ngo believes she is likely ovulating then and OPKs are just not detecting), and she expects next period to start around Memorial Day.  Lab orders placed (futured; TSH w/free T4 reflex, FSH, prolactin, estradiol, AMH)    Pt reports they haven't done any screening/diagnostics on 's sperm; \"he'd be fine with only 2 kids.\" Pt wants to start with testing herself first; didn't expect to have any problems getting pregnant after IUD removal 4/8/17. We reviewed the possible next steps after blood work for fertility testing, pt verbalized understanding. Support and encouragement provided.     Pt will call clinic on the first day of red blood flow of next period for lab-only appt. She is also aware that if day 3 falls on a weekend, she can have blood drawn at [facility] and has number to call their lab for an appt. Pt has no other questions at this time.     Routing to Dr. Ngo as KENDALL            "

## 2018-05-25 DIAGNOSIS — Z31.41 FERTILITY TESTING: Primary | ICD-10-CM

## 2018-05-25 LAB
ESTRADIOL SERPL-MCNC: 47 PG/ML
FSH SERPL-ACNC: 8.4 IU/L
PROLACTIN SERPL-MCNC: 8 UG/L (ref 3–27)
TSH SERPL DL<=0.005 MIU/L-ACNC: 2.31 MU/L (ref 0.4–4)

## 2018-05-25 PROCEDURE — 84146 ASSAY OF PROLACTIN: CPT | Performed by: OBSTETRICS & GYNECOLOGY

## 2018-05-25 PROCEDURE — 83001 ASSAY OF GONADOTROPIN (FSH): CPT | Performed by: OBSTETRICS & GYNECOLOGY

## 2018-05-25 PROCEDURE — 83520 IMMUNOASSAY QUANT NOS NONAB: CPT | Mod: 90 | Performed by: OBSTETRICS & GYNECOLOGY

## 2018-05-25 PROCEDURE — 82670 ASSAY OF TOTAL ESTRADIOL: CPT | Performed by: OBSTETRICS & GYNECOLOGY

## 2018-05-25 PROCEDURE — 99000 SPECIMEN HANDLING OFFICE-LAB: CPT | Performed by: OBSTETRICS & GYNECOLOGY

## 2018-05-25 PROCEDURE — 84443 ASSAY THYROID STIM HORMONE: CPT | Performed by: OBSTETRICS & GYNECOLOGY

## 2018-05-25 PROCEDURE — 36415 COLL VENOUS BLD VENIPUNCTURE: CPT | Performed by: OBSTETRICS & GYNECOLOGY

## 2018-05-27 LAB — MIS SERPL-MCNC: 2.85 NG/ML (ref 0.18–11.71)

## 2018-05-30 ENCOUNTER — TELEPHONE (OUTPATIENT)
Dept: NURSING | Facility: CLINIC | Age: 33
End: 2018-05-30

## 2018-05-30 NOTE — TELEPHONE ENCOUNTER
Karen Ngo MD  P We Triage                   All of her labs look great.   Her AMH is 2.8 which is exactly what it should be. Average for age 30 is 3.0 so she's right on at 2.8 for 32 years old. Anything above 2.5 means good egg counts.   I don't have anything hormonally to explain why they're not pregnant.   Since they've been pregnant twice before I'm not sure what has changed but I do think we should probably have her  come and do a semen analysis b/c that's more likely to be an issue than all the sudden she has blocked tubes. Have her[facility]him arrange that at [facility] and then i'll f/u with her after that       Called pt with detailed phone message regarding the above result note from Dr. Ngo from labs 5/25/18. If pt has questions regarding what was told over the phone, it is in reference to above note and [facility] recommendations.  Anum LUNA RN

## 2018-08-23 ENCOUNTER — TELEPHONE (OUTPATIENT)
Dept: OBGYN | Facility: CLINIC | Age: 33
End: 2018-08-23

## 2018-08-23 NOTE — TELEPHONE ENCOUNTER
LMP: 7/15/18 @5w4d  OV 18 Dr. Ngo      Pt calling with nausea which started last evening with new pregnancy. Has hx with previous pregnancies with nausea and AJ prescribed Zofran. She cannot get in to see her earlier than .  Went over the Vit B6 and Unisom recommendation along with sips of cold fluids, sea band acupressure bands OTC, and trying kelvin root tea and/or peppermint candy.    Pt is willing to try the above recommendations and will call if these are not affective in the next few weeks.    Pt verbalized understanding and no further questions.

## 2018-08-26 ENCOUNTER — NURSE TRIAGE (OUTPATIENT)
Dept: NURSING | Facility: CLINIC | Age: 33
End: 2018-08-26

## 2018-08-26 NOTE — TELEPHONE ENCOUNTER
"Marivel is 6 weeks pregnant.  It's her third pregnancy. She has a four year old and a seven year old. She noticed a small amount of pink on the toilet tissue early this a.m. And then after she awoke for the day there was bright red blood mixed with mucous on the tissue. Nothing in her underwear.  She had intercourse yesterday. She called asking what she should do.  I gave her advice from protocol including reasons to call back. She stated understanding and was in agreement with instructions given. She'll call the clinic in the morning about possibly being seen sooner than her 9/28/2018 already scheduled appointment.    Additional Information    Negative: Shock suspected (e.g., cold/pale/clammy skin, too weak to stand, low BP, rapid pulse)    Negative: Difficult to awaken or acting confused  (e.g., disoriented, slurred speech)    Negative: Passed out (i.e., lost consciousness, collapsed and was not responding)    Negative: Sounds like a life-threatening emergency to the triager    Negative: SEVERE abdominal pain    Negative: [1] SEVERE vaginal bleeding (i.e., soaking 2 pads / hour, large blood clots) AND [2] present 2 or more hours    Negative: [1] MODERATE vaginal bleeding (i.e., soaking 1 pad / hour; clots) AND [2] present > 6 hours    Negative: [1] MODERATE vaginal bleeding (i.e., soaking 1 pad / hour; clots) AND [2] pregnant > 12 weeks    Negative: Passed tissue (e.g., gray-white)    Negative: Shoulder pain    Negative: [1] Constant abdominal pain AND [2] present > 2 hours    Negative: Fever > 100.4 F (38.0 C)    Negative: [1] Intermittent lower abdominal pain (e.g., cramping) AND [2] present > 24 hours    Negative: Prior history of \"ectopic pregnancy\" or previous tubal surgery (e.g., tubal ligation)    Negative: Burning with urination    Negative: Has IUD    Negative: MILD vaginal bleeding (i.e., clots or similar to menstrual period; not just spotting)    Negative: SPOTTING lasts > 48 hours or spotting happens " more than once in a week    Negative: Unusual vaginal discharge (e.g., bad smelling, yellow, green, or foamy-white)    Negative: Not feeling pregnant any longer (e.g., breast tenderness or nausea has disappeared)    SPOTTING after sexual intercourse (single or brief episode) (all triage questions negative)    Protocols used: PREGNANCY - VAGINAL BLEEDING LESS THAN 20 WEEKS EGA-ADULT-  Camilla BENDER RN [facility]

## 2018-08-27 ENCOUNTER — TELEPHONE (OUTPATIENT)
Dept: OBGYN | Facility: CLINIC | Age: 33
End: 2018-08-27

## 2018-08-27 NOTE — TELEPHONE ENCOUNTER
Pt. Currently 6 weeks pregnant, c/o spotting that began this weekend, possibly very mild cramping.

## 2018-08-27 NOTE — TELEPHONE ENCOUNTER
Pt says that she had spoke with triage over the weekend. She noticed so brownish spotting on Saturday nite. Denies any cramping. She did have IC over the weekend. The spotting is not constant.  Discussed that IC can cause spotting/ Discussed that she should just monitor. Drink plenty of fluids. Contact the clinic if her spotting should become heavy flow- changing pad hourly, severe cramping and passing large clots. Pt verbalized understanding.

## 2018-08-28 ENCOUNTER — TELEPHONE (OUTPATIENT)
Dept: OBGYN | Facility: CLINIC | Age: 33
End: 2018-08-28

## 2018-08-28 DIAGNOSIS — O21.9 NAUSEA AND VOMITING DURING PREGNANCY: Primary | ICD-10-CM

## 2018-08-28 RX ORDER — ONDANSETRON 4 MG/1
4 TABLET, FILM COATED ORAL EVERY 8 HOURS PRN
Qty: 30 TABLET | Refills: 1 | Status: SHIPPED | OUTPATIENT
Start: 2018-08-28 | End: 2018-09-18

## 2018-08-28 NOTE — TELEPHONE ENCOUNTER
"LMP: 7/15/18 @ 6w2d    Pt suffering from nausea with early pregnancy  Wakes at 0300, wide awake and starts up with nausea. Gags but does not vomit. Staying hydrated with sips of H2O. Difficult to function during day at work.    Called and spoke with our triage last week and given things to try per algorithm and stated she tried everything with no help. Unisom makes her feel \"crazy\". She states nausea hitting earlier than previous pregnancies.   Hx of using Zofran PO with previous pregnancies with good relief.  Requesting Zofran now. Aware AJ is out of town till next week.  Routing to on call Dr. Dietrich to advise. Will call pt with response.  "

## 2018-08-28 NOTE — TELEPHONE ENCOUNTER
Called and informed pt of Zofran Rx sent to her pharmacy. Instructions given. Pt verbalized understanding and no further questions.

## 2018-09-10 ENCOUNTER — TELEPHONE (OUTPATIENT)
Dept: OBGYN | Facility: CLINIC | Age: 33
End: 2018-09-10

## 2018-09-10 NOTE — TELEPHONE ENCOUNTER
Pt cant see Dr. Ngo until she is 11 weeks and she is wanting to know if she should be scheduling the advanced testing since she is 33 years old.  She had to do a ultrasound and genetic counseling at  because she is advanced maternal age.  She is wanting to know what Dr. Ngo Is recommending her to do so she can keep her appointments on track.  She would like to schedule these appointments sooner than later if she has to do them.

## 2018-09-10 NOTE — TELEPHONE ENCOUNTER
I usually do innatal rather than the other stuff so she shouldn't have to schedule that. Warn her on new cost structure rather than$99 but it's still cheaper than an NT with first tri screen with MFM

## 2018-09-10 NOTE — TELEPHONE ENCOUNTER
Pt returned call. Wanted to go over what Innatal tests for. Reviewed information. Sending Innatal information to pt's home. No further questions.

## 2018-09-18 DIAGNOSIS — O21.9 NAUSEA AND VOMITING DURING PREGNANCY: ICD-10-CM

## 2018-09-18 RX ORDER — ONDANSETRON 4 MG/1
4 TABLET, FILM COATED ORAL EVERY 8 HOURS PRN
Qty: 30 TABLET | Refills: 0 | Status: SHIPPED | OUTPATIENT
Start: 2018-09-18 | End: 2018-09-28

## 2018-09-18 NOTE — TELEPHONE ENCOUNTER
LMP 7/17/18, 9w0d. Pt requesting refill of Zofran for her morning sickness. Is taking every 8 hours during the day. Has only three pills left. Pt is taking fiber supplements for constipation. BM about every other day.     ondansetron (ZOFRAN) 4 MG tablet   Last Written Prescription Date:  8/28/18  Last Fill Quantity: 30,   # refills: 1  Last Office Visit with [facility] primary care provider:  1/26/18  Future Office visit: 9/28/18 [facility]    Routing refill request to provider for review/approval because:  Routing to Dr. Petit, on call. [place] to send refill?

## 2018-09-27 DIAGNOSIS — O36.80X0 PREGNANCY WITH INCONCLUSIVE FETAL VIABILITY: Primary | ICD-10-CM

## 2018-09-28 ENCOUNTER — PRENATAL OFFICE VISIT (OUTPATIENT)
Dept: OBGYN | Facility: CLINIC | Age: 33
End: 2018-09-28
Payer: COMMERCIAL

## 2018-09-28 ENCOUNTER — RADIANT APPOINTMENT (OUTPATIENT)
Dept: ULTRASOUND IMAGING | Facility: CLINIC | Age: 33
End: 2018-09-28
Payer: COMMERCIAL

## 2018-09-28 VITALS
SYSTOLIC BLOOD PRESSURE: 123 MMHG | DIASTOLIC BLOOD PRESSURE: 75 MMHG | HEIGHT: 67 IN | HEART RATE: 77 BPM | BODY MASS INDEX: 32.83 KG/M2 | WEIGHT: 209.2 LBS

## 2018-09-28 DIAGNOSIS — O21.9 NAUSEA AND VOMITING DURING PREGNANCY: ICD-10-CM

## 2018-09-28 DIAGNOSIS — O09.529 SUPERVISION OF HIGH-RISK PREGNANCY OF ELDERLY MULTIGRAVIDA: Primary | ICD-10-CM

## 2018-09-28 DIAGNOSIS — O36.80X0 PREGNANCY WITH INCONCLUSIVE FETAL VIABILITY: ICD-10-CM

## 2018-09-28 DIAGNOSIS — Z13.79 GENETIC SCREENING: ICD-10-CM

## 2018-09-28 DIAGNOSIS — Z23 NEED FOR PROPHYLACTIC VACCINATION AND INOCULATION AGAINST INFLUENZA: ICD-10-CM

## 2018-09-28 DIAGNOSIS — E03.8 SUBCLINICAL HYPOTHYROIDISM: ICD-10-CM

## 2018-09-28 LAB
ABO + RH BLD: NORMAL
ABO + RH BLD: NORMAL
ALBUMIN UR-MCNC: NEGATIVE MG/DL
APPEARANCE UR: CLEAR
BILIRUB UR QL STRIP: NEGATIVE
BLD GP AB SCN SERPL QL: NORMAL
BLOOD BANK CMNT PATIENT-IMP: NORMAL
COLOR UR AUTO: YELLOW
ERYTHROCYTE [DISTWIDTH] IN BLOOD BY AUTOMATED COUNT: 12.9 % (ref 10–15)
GLUCOSE UR STRIP-MCNC: NEGATIVE MG/DL
HCT VFR BLD AUTO: 41.1 % (ref 35–47)
HGB BLD-MCNC: 14.2 G/DL (ref 11.7–15.7)
HGB UR QL STRIP: NEGATIVE
KETONES UR STRIP-MCNC: NEGATIVE MG/DL
LEUKOCYTE ESTERASE UR QL STRIP: NEGATIVE
MCH RBC QN AUTO: 29.8 PG (ref 26.5–33)
MCHC RBC AUTO-ENTMCNC: 34.5 G/DL (ref 31.5–36.5)
MCV RBC AUTO: 86 FL (ref 78–100)
NITRATE UR QL: NEGATIVE
PH UR STRIP: 5.5 PH (ref 5–7)
PLATELET # BLD AUTO: 296 10E9/L (ref 150–450)
RBC # BLD AUTO: 4.77 10E12/L (ref 3.8–5.2)
RUBELLA ABY IGG: NORMAL
SOURCE: NORMAL
SP GR UR STRIP: >1.03 (ref 1–1.03)
SPECIMEN EXP DATE BLD: NORMAL
UROBILINOGEN UR STRIP-ACNC: 0.2 EU/DL (ref 0.2–1)
WBC # BLD AUTO: 10.7 10E9/L (ref 4–11)

## 2018-09-28 PROCEDURE — 90686 IIV4 VACC NO PRSV 0.5 ML IM: CPT | Performed by: OBSTETRICS & GYNECOLOGY

## 2018-09-28 PROCEDURE — 86850 RBC ANTIBODY SCREEN: CPT | Performed by: OBSTETRICS & GYNECOLOGY

## 2018-09-28 PROCEDURE — 40000791 ZZHCL STATISTIC VERIFI PRENATAL TRISOMY 21,18,13: Mod: 90 | Performed by: OBSTETRICS & GYNECOLOGY

## 2018-09-28 PROCEDURE — 99207 ZZC FIRST OB VISIT: CPT | Performed by: OBSTETRICS & GYNECOLOGY

## 2018-09-28 PROCEDURE — 85027 COMPLETE CBC AUTOMATED: CPT | Performed by: OBSTETRICS & GYNECOLOGY

## 2018-09-28 PROCEDURE — 99000 SPECIMEN HANDLING OFFICE-LAB: CPT | Performed by: OBSTETRICS & GYNECOLOGY

## 2018-09-28 PROCEDURE — 84439 ASSAY OF FREE THYROXINE: CPT | Performed by: OBSTETRICS & GYNECOLOGY

## 2018-09-28 PROCEDURE — 76817 TRANSVAGINAL US OBSTETRIC: CPT | Performed by: OBSTETRICS & GYNECOLOGY

## 2018-09-28 PROCEDURE — 87086 URINE CULTURE/COLONY COUNT: CPT | Performed by: OBSTETRICS & GYNECOLOGY

## 2018-09-28 PROCEDURE — 87340 HEPATITIS B SURFACE AG IA: CPT | Performed by: OBSTETRICS & GYNECOLOGY

## 2018-09-28 PROCEDURE — 36415 COLL VENOUS BLD VENIPUNCTURE: CPT | Performed by: OBSTETRICS & GYNECOLOGY

## 2018-09-28 PROCEDURE — 87389 HIV-1 AG W/HIV-1&-2 AB AG IA: CPT | Performed by: OBSTETRICS & GYNECOLOGY

## 2018-09-28 PROCEDURE — 86780 TREPONEMA PALLIDUM: CPT | Performed by: OBSTETRICS & GYNECOLOGY

## 2018-09-28 PROCEDURE — 90471 IMMUNIZATION ADMIN: CPT | Performed by: OBSTETRICS & GYNECOLOGY

## 2018-09-28 PROCEDURE — 86900 BLOOD TYPING SEROLOGIC ABO: CPT | Performed by: OBSTETRICS & GYNECOLOGY

## 2018-09-28 PROCEDURE — 81003 URINALYSIS AUTO W/O SCOPE: CPT | Performed by: OBSTETRICS & GYNECOLOGY

## 2018-09-28 PROCEDURE — 86762 RUBELLA ANTIBODY: CPT | Performed by: OBSTETRICS & GYNECOLOGY

## 2018-09-28 PROCEDURE — 86901 BLOOD TYPING SEROLOGIC RH(D): CPT | Performed by: OBSTETRICS & GYNECOLOGY

## 2018-09-28 PROCEDURE — 84443 ASSAY THYROID STIM HORMONE: CPT | Performed by: OBSTETRICS & GYNECOLOGY

## 2018-09-28 RX ORDER — ONDANSETRON 8 MG/1
8 TABLET, FILM COATED ORAL EVERY 8 HOURS PRN
Qty: 60 TABLET | Refills: 1 | Status: SHIPPED | OUTPATIENT
Start: 2018-09-28 | End: 2018-11-09

## 2018-09-28 ASSESSMENT — ANXIETY QUESTIONNAIRES
IF YOU CHECKED OFF ANY PROBLEMS ON THIS QUESTIONNAIRE, HOW DIFFICULT HAVE THESE PROBLEMS MADE IT FOR YOU TO DO YOUR WORK, TAKE CARE OF THINGS AT HOME, OR GET ALONG WITH OTHER PEOPLE: NOT DIFFICULT AT ALL
3. WORRYING TOO MUCH ABOUT DIFFERENT THINGS: NOT AT ALL
GAD7 TOTAL SCORE: 0
6. BECOMING EASILY ANNOYED OR IRRITABLE: NOT AT ALL
5. BEING SO RESTLESS THAT IT IS HARD TO SIT STILL: NOT AT ALL
2. NOT BEING ABLE TO STOP OR CONTROL WORRYING: NOT AT ALL
1. FEELING NERVOUS, ANXIOUS, OR ON EDGE: NOT AT ALL
7. FEELING AFRAID AS IF SOMETHING AWFUL MIGHT HAPPEN: NOT AT ALL

## 2018-09-28 ASSESSMENT — PATIENT HEALTH QUESTIONNAIRE - PHQ9: 5. POOR APPETITE OR OVEREATING: NOT AT ALL

## 2018-09-28 NOTE — PROGRESS NOTES

## 2018-09-28 NOTE — MR AVS SNAPSHOT
After Visit Summary   9/28/2018    Marivel Jones    MRN: [id number]           Patient Information     Date Of Birth          1985        Visit Information        Provider Department      9/28/2018 1:30 PM Karen Ngo MD; [facility] [place]        Today's Diagnoses     Supervision of high-risk pregnancy of elderly multigravida    -  1    Genetic screening        Need for prophylactic vaccination and inoculation against influenza        Subclinical hypothyroidism        Nausea and vomiting during pregnancy           Follow-ups after your visit        Your next 10 appointments already scheduled     Oct 24, 2018  1:00 PM CDT   ESTABLISHED PRENATAL with Karen Ngo MD   [facility] [place] ([facility])    [address] [place] [id number] [phone]              Future tests that were ordered for you today     Open Future Orders        Priority Expected Expires Ordered    Non Invasive Prenatal Test Cell Free DNA Routine  9/28/2019 9/28/2018            Who to contact     If you have questions or need follow up information about today's clinic visit or your schedule please contact [facility] directly at [phone].  Normal or non-critical lab and imaging results will be communicated to you by [employer], letter or phone within 4 business days after the clinic has received the results. If you do not hear from us within 7 days, please contact the clinic through [employer] or phone. If you have a critical or abnormal lab result, we will notify you by phone as soon as possible.  Submit refill requests through [employer] or call your pharmacy and they will forward the refill request to us. Please allow 3 business days for your refill to be completed.          Additional Information About Your Visit        [employer] Information     [employer] lets you send messages to your doctor, view your test  "results, renew your prescriptions, schedule appointments and more. To sign up, go to [email or web address] . Click on \"Log in\" on the left side of the screen, which will take you to the Welcome page. Then click on \"Sign up Now\" on the right side of the page.     You will be asked to enter the access code listed below, as well as some personal information. Please follow the directions to create your username and password.     Your access code is: [email or web address]  Expires: 2018 12:43 PM     Your access code will  in 90 days. If you need help or a new code, please call your [place] clinic or [phone].        Care EveryWhere ID     This is your Care EveryWhere ID. This could be used by other organizations to access your [place] medical records  [id number]        Your Vitals Were     Pulse Height Last Period BMI (Body Mass Index)          77 5' 6.5\" (1.689 m) 07/15/2018 33.26 kg/m2         Blood Pressure from Last 3 Encounters:   18 123/75   18 112/66   17 120/80    Weight from Last 3 Encounters:   18 209 lb 3.2 oz (94.9 kg)   18 198 lb (89.8 kg)   17 188 lb (85.3 kg)              We Performed the Following     *UA reflex to Microscopic     ABO/Rh type and screen     CBC with platelets     FLU VACCINE, SPLIT VIRUS, IM (QUADRIVALENT) [12737]- >3 YRS     Hepatitis B surface antigen     HIV Antigen Antibody Combo     Rubella Antibody IgG Quantitative     T4, free     Treponema Abs w Reflex to RPR and Titer     TSH     Urine Culture Aerobic Bacterial     Vaccine Administration, Initial [[id number]]          Today's Medication Changes          These changes are accurate as of 18  2:43 PM.  If you have any questions, ask your nurse or doctor.               These medicines have changed or have updated prescriptions.        Dose/Directions    ondansetron 8 MG tablet   Commonly known as:  ZOFRAN   This may have changed:    - medication strength  - how much to take   Used for: "  Nausea and vomiting during pregnancy   Changed by:  Karen Ngo MD        Dose:  8 mg   Take 1 tablet (8 mg) by mouth every 8 hours as needed for nausea   Quantity:  60 tablet   Refills:  1            Where to get your medicines      These medications were sent to [facility] [address] [facility] - [place], [place] - [address]  [address] [place] [address]     Phone:  [phone]     ondansetron 8 MG tablet                Primary Care Provider Office Phone # Fax #    Karen Ngo [phone] [address] [facility] [address]  [facility] [address]        Equal Access to Services     [facility]: Hadii aad ku hadasho Soomaali, waaxda luqadaha, qaybta kaalmada aderickieyada, wendi irvin . So [facility] [phone].    ATENCIÓN: Si habla español, tiene a saba disposición servicios gratuitos de asistencia lingüística. Ll[facility] [phone].    We comply with applicable federal civil rights laws and [place] laws. We do not discriminate on the basis of race, color, national origin, age, disability, sex, sexual orientation, or gender identity.            Thank you!     Thank you for choosing [facility]  for your care. Our goal is always to provide you with excellent care. Hearing back from our patients is one way we can continue to improve our services. Please take a few minutes to complete the written survey that you may receive in the mail after your visit with us. Thank you!             Your Updated Medication List - Protect others around you: Learn how to safely use, store and throw away your medicines at www.disposemymeds.org.          This list is accurate as of 9/28/18  2:43 PM.  Always use your most recent med list.                   Brand Name Dispense Instructions for use Diagnosis    ondansetron 8 MG tablet    ZOFRAN    60 tablet    Take 1 tablet (8 mg) by mouth every 8 hours as needed for nausea    Nausea and vomiting during pregnancy       PRENATAL VITAMINS  PO      Take 1 tablet by mouth    Supervision of high-risk pregnancy of elderly multigravida, Genetic screening, Need for prophylactic vaccination and inoculation against influenza

## 2018-09-28 NOTE — PROGRESS NOTES
SUBJECTIVE:     HPI:    This is a 33 year old female patient,  who presents for her first obstetrical visit.    MIKE: 2019, by Last Menstrual Period.  She is 10w5d weeks.  Her cycles are regular at 28 days but her sure DOC was  b/c had a +OPK that day in [place] and came home and that night had I.C.  HErHer last menstrual period was normal. Her U/S today shows 11+4 but DOC is 11+2 so will use  as her EDC  Since her LMP, she has experienced  nausea, vaginal bleeding and cramping early on).   She denies emesis, abdominal pain, fatigue, headache, loss of appetite, vaginal discharge, dysuria, pelvic pain, urinary urgency, lightheadedness, urinary frequency, hemorrhoids and constipation.    With the boys she used zofran and it fully worked and was done by 12 weeks or so and much milder. This time is much more nauseous. Taking the 4mg zofran and it only just slightly improves the nausea but it's still there. Is already somewhat constipated but not taking any stool softeners at all yet.    Had bleeding around 6-7 weeks but then none since. Nothing abnormal seen on U/S    Patient is AMA this time. Does want innatal if covered by insurance. Did a FTS with [facility] so would do that again if it's not covered    Additional History: AMA    Have you travelled during the pregnancy?No  Have your sexual partner(s) travelled during the pregnancy?No      HISTORY:   Planned Pregnancy: Yes  Marital Status:   Occupation: Human Resources  Living in Household: Spouse and Children    Past History:  Her past medical history   Past Medical History:   Diagnosis Date     Abnormal Pap smear     colposcopy , 9 o'clock bx from colpo was benign     Condyloma acuminatum     TCA'd     History of tetanus, diphtheria, and acellular pertussis booster vaccination (Tdap) 12     History of vaccination against human papillomavirus     completed     Hypothyroidism     Dr. Aiken. followed summer 2012  "and levels normal off meds so stopped them.     IUD contraception 02/02/2014    Mirena placed by [facility]. removed 4/3/17 for pregnancy     Overweight      Postpartum depression 2013    10 days postpartum onset     Vitamin D deficiency 6/9/2015   .      She has a history of  Vaginal Deliveries    Since her last LMP she denies use of alcohol, tobacco and street drugs.    Past medical, surgical, social and family history were reviewed and updated in [facility].        Current Outpatient Prescriptions   Medication     ondansetron (ZOFRAN) 8 MG tablet     Prenatal Multivit-Min-Fe-FA (PRENATAL VITAMINS PO)     No current facility-administered medications for this visit.        ROS:   12 point review of systems negative other than symptoms noted below.  Constitutional: NA  Genitourinary: Cramps and vaginal bleeding at 6 weeks      OBJECTIVE:     EXAM:  /75 (BP Location: Right arm, Patient Position: Sitting, Cuff Size: Adult Regular)  Pulse 77  Ht 5' 6.5\" (1.689 m)  Wt 209 lb 3.2 oz (94.9 kg)  LMP 07/15/2018  BMI 33.26 kg/m2 Body mass index is 33.26 kg/(m^2).    GENERAL: healthy, alert and no distress  EYES: Eyes grossly normal to inspection, PERRL and conjunctivae and sclerae normal  HENT: ear canals and TM's normal, nose and mouth without ulcers or lesions  NECK: no adenopathy, no asymmetry, masses, or scars and thyroid normal to palpation  RESP: lungs clear to auscultation - no rales, rhonchi or wheezes  BREAST: normal without masses, tenderness or nipple discharge and no palpable axillary masses or adenopathy  CV: regular rate and rhythm, normal S1 S2, no S3 or S4, no murmur, click or rub, no peripheral edema and peripheral pulses strong  ABDOMEN: soft, nontender, no hepatosplenomegaly, no masses and bowel sounds normal  MS: no gross musculoskeletal defects noted, no edema  SKIN: no suspicious lesions or rashes  NEURO: Normal strength and tone, mentation intact and speech normal  PSYCH: mentation appears normal, " affect normal/bright    ASSESSMENT/PLAN:       ICD-10-CM    1. Supervision of high-risk pregnancy of elderly multigravida O09.529 Non Invasive Prenatal Test Cell Free DNA     Prenatal Multivit-Min-Fe-FA (PRENATAL VITAMINS PO)     ABO/Rh type and screen     Hepatitis B surface antigen     CBC with platelets     HIV Antigen Antibody Combo     Rubella Antibody IgG Quantitative     Treponema Abs w Reflex to RPR and Titer     Urine Culture Aerobic Bacterial     *UA reflex to Microscopic     FLU VACCINE, SPLIT VIRUS, IM (QUADRIVALENT) [18233]- >3 YRS     Vaccine Administration, Initial [07910]     TSH     T4, free   2. Genetic screening Z13.79 Prenatal Multivit-Min-Fe-FA (PRENATAL VITAMINS PO)     Urine Culture Aerobic Bacterial     *UA reflex to Microscopic     FLU VACCINE, SPLIT VIRUS, IM (QUADRIVALENT) [14787]- >3 YRS     Vaccine Administration, Initial [21891]   3. Need for prophylactic vaccination and inoculation against influenza Z23 Prenatal Multivit-Min-Fe-FA (PRENATAL VITAMINS PO)     Urine Culture Aerobic Bacterial     *UA reflex to Microscopic     FLU VACCINE, SPLIT VIRUS, IM (QUADRIVALENT) [57979]- >3 YRS     Vaccine Administration, Initial [99798]   4. Subclinical hypothyroidism E03.9    5. Nausea and vomiting during pregnancy O21.9 ondansetron (ZOFRAN) 8 MG tablet       33 year old , 10w5d weeks of pregnancy with MIKE of 2019, by Last Menstrual Period          PLAN/PATIENT INSTRUCTIONS:    Patient's U/S is about 2 days ahead of sure DOC and 6 days ahead of LMP so will use sure DOC for her EDC and it is 19  Increase zofran to 8mg TID and start otc stool softeners  NOB labs and will do her innatal today  Flu shot today  Subclinical hypothyroid in past but not on meds now. Will do a tsh and free t4 today  Return 3 wks     Karen Ngo MD  Important Information for Provider: AMA    Prenatal OB Questionnaire      Allergies as of 2018:    Allergies as of 2018     (No Known Allergies)        Current medications are:  Current Outpatient Prescriptions   Medication Sig Dispense Refill     ondansetron (ZOFRAN) 8 MG tablet Take 1 tablet (8 mg) by mouth every 8 hours as needed for nausea 60 tablet 1     Prenatal Multivit-Min-Fe-FA (PRENATAL VITAMINS PO) Take 1 tablet by mouth           Early ultrasound screening tool:    Does patient have irregular periods?  No  Did patient use hormonal birth control in the three months prior to positive urine pregnancy test? No  Is the patient breastfeeding?  No  Is the patient 10 weeks or greater at time of education visit?  Yes    Viable IUP in today's US

## 2018-09-29 LAB
RUBV IGG SERPL IA-ACNC: 31 IU/ML
T PALLIDUM AB SER QL: NONREACTIVE
T4 FREE SERPL-MCNC: 0.99 NG/DL (ref 0.76–1.46)
TSH SERPL DL<=0.005 MIU/L-ACNC: 1.42 MU/L (ref 0.4–4)

## 2018-09-29 ASSESSMENT — ANXIETY QUESTIONNAIRES: GAD7 TOTAL SCORE: 0

## 2018-09-29 ASSESSMENT — PATIENT HEALTH QUESTIONNAIRE - PHQ9: SUM OF ALL RESPONSES TO PHQ QUESTIONS 1-9: 0

## 2018-09-30 LAB
BACTERIA SPEC CULT: NORMAL
Lab: NORMAL
SPECIMEN SOURCE: NORMAL

## 2018-10-01 LAB
HBV SURFACE AG SERPL QL IA: NONREACTIVE
HIV 1+2 AB+HIV1 P24 AG SERPL QL IA: NONREACTIVE

## 2018-10-03 ENCOUNTER — TELEPHONE (OUTPATIENT)
Dept: NURSING | Facility: CLINIC | Age: 33
End: 2018-10-03

## 2018-10-03 DIAGNOSIS — O09.529 SUPERVISION OF HIGH-RISK PREGNANCY OF ELDERLY MULTIGRAVIDA: ICD-10-CM

## 2018-10-03 NOTE — TELEPHONE ENCOUNTER
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

## 2018-10-03 NOTE — TELEPHONE ENCOUNTER
Pt called back for results. Results given. Gender revealed over the phone per pt request. She is ectatic as she has 2 boys at home.  Pt verbalized understanding and no further questions.

## 2018-10-05 LAB — NON INVASIVE PRENATAL TEST CELL FREE DNA: NORMAL

## 2018-10-19 ENCOUNTER — PRENATAL OFFICE VISIT (OUTPATIENT)
Dept: OBGYN | Facility: CLINIC | Age: 33
End: 2018-10-19
Payer: COMMERCIAL

## 2018-10-19 VITALS — BODY MASS INDEX: 32.91 KG/M2 | SYSTOLIC BLOOD PRESSURE: 104 MMHG | WEIGHT: 207 LBS | DIASTOLIC BLOOD PRESSURE: 70 MMHG

## 2018-10-19 DIAGNOSIS — O09.529 SUPERVISION OF HIGH-RISK PREGNANCY OF ELDERLY MULTIGRAVIDA: Primary | ICD-10-CM

## 2018-10-19 PROCEDURE — 99207 ZZC PRENATAL VISIT: CPT | Performed by: OBSTETRICS & GYNECOLOGY

## 2018-10-19 NOTE — MR AVS SNAPSHOT
After Visit Summary   10/19/2018    Marivel Jones    MRN: [id number]           Patient Information     Date Of Birth          1985        Visit Information        Provider Department      10/19/2018 10:50 AM Karen Ngo MD [facility]        Today's Diagnoses     Supervision of high-risk pregnancy of elderly multigravida    -  1       Follow-ups after your visit        Your next 10 appointments already scheduled     Nov 09, 2018  9:20 AM CST   ESTABLISHED PRENATAL with Karen Ngo MD   [facility] ([facility])    [address]  [facility] [address]   [phone]            Dec 07, 2018  8:00 AM CST   US OB >14 WEEKS with WEUS2   [facility] ([facility])    [address]  [facility] [address]   [phone]           How do I prepare for my exam? (Food and drink instructions) Drink four 8-ounce glasses of fluid an hour before your exam. If you need to empty your bladder before your exam, try to release only a little urine. Then, drink another glass of fluid.  How do I prepare for my exam? (Other instructions) You may have up to two family members in the exam room. If you bring a small child, an adult must be there to care for him or her. No video or camera photography during the procedure.  What should I wear: Wear comfortable clothes.  How long does the exam take: Most ultrasounds take 30 to 60 minutes.  What should I bring: Bring a list of your medicines, including vitamins, minerals and over-the-counter drugs. It is safest to leave personal items at home.  Do I need a :  No  is needed.  What do I need to tell my doctor: Tell your doctor about any allergies you may have.  What should I do after the exam: No restrictions, You may resume normal activities.  What is this test: An ultrasound uses sound waves to make pictures of  "the body. Sound waves do not cause pain. The only discomfort may be the pressure of the wand against your skin or full bladder.  Who should I call with questions: If you have any questions, please call the Imaging Department where you will have your exam. Directions, parking instructions, and other information is available on our website, [email or web address].            Dec 07, 2018  8:50 AM CST   ESTABLISHED PRENATAL with Karen Ngo MD   [facility] ([facility])    [address] [place] [address] [address]  [facility] [id number] [phone]            Jan 04, 2019  9:40 AM CST   ESTABLISHED PRENATAL with Karen Ngo MD   [facility] ([facility]    [address] [place] [address] [address]  [facility] [id number] [phone]              Who to contact     If you have questions or need follow up information about today's clinic visit or your schedule please contact [facility] directly at [phone].  Normal or non-critical lab and imaging results will be communicated to you by [employer], letter or phone within 4 business days after the clinic has received the results. If you do not hear from us within 7 days, please contact the clinic through [employer] or phone. If you have a critical or abnormal lab result, we will notify you by phone as soon as possible.  Submit refill requests through [employer] or call your pharmacy and they will forward the refill request to us. Please allow 3 business days for your refill to be completed.          Additional Information About Your Visit        [employer] Information     [employer] lets you send messages to your doctor, view your test results, renew your prescriptions, schedule appointments and more. To sign up, go to [email or web address] . Click on \"Log in\" on the left side of the screen, which will take you to the Welcome page. Then click on \"Sign up Now\" on the right side of the " page.     You will be asked to enter the access code listed below, as well as some personal information. Please follow the directions to create your username and password.     Your access code is: [email or web address]  Expires: 2018 12:43 PM     Your access code will  in 90 days. If you need help or a new code, please call your [place] clinic or [phone].        Care EveryWhere ID     This is your Care EveryWhere ID. This could be used by other organizations to access your [place] medical records  [id number]        Your Vitals Were     Last Period Breastfeeding? BMI (Body Mass Index)             07/15/2018 No 32.91 kg/m2          Blood Pressure from Last 3 Encounters:   10/19/18 104/70   18 123/75   18 112/66    Weight from Last 3 Encounters:   10/19/18 207 lb (93.9 kg)   18 209 lb 3.2 oz (94.9 kg)   18 198 lb (89.8 kg)              Today, you had the following     No orders found for display       Primary Care Provider Office Phone # Fax #    Karen Ngo [phone] [address] [address]        Equal Access to Services     PRIYA VENEGAS : Hadii aad ku hadasho Soomaali, waaxda luqadaha, qaybta kaalmada adeegyada, wendi irvin . So [facility] [phone].    ATENCIÓN: Si habla español, tiene a saba disposición servicios gratuitos de asistencia lingüística. Llame al [phone].    We comply with applicable federal civil rights laws and [place] laws. We do not discriminate on the basis of race, color, national origin, age, disability, sex, sexual orientation, or gender identity.            Thank you!     Thank you for choosing [facility]  for your care. Our goal is always to provide you with excellent care. Hearing back from our patients is one way we can continue to improve our services. Please take a few minutes to complete the written survey that you may receive in the mail after your visit  with us. Thank you!             Your Updated Medication List - Protect others around you: Learn how to safely use, store and throw away your medicines at www.disposemymeds.org.          This list is accurate as of 10/19/18 12:42 PM.  Always use your most recent med list.                   Brand Name Dispense Instructions for use Diagnosis    ondansetron 8 MG tablet    ZOFRAN    60 tablet    Take 1 tablet (8 mg) by mouth every 8 hours as needed for nausea    Nausea and vomiting during pregnancy       PRENATAL VITAMINS PO      Take 1 tablet by mouth    Supervision of high-risk pregnancy of elderly multigravida, Genetic screening, Need for prophylactic vaccination and inoculation against influenza

## 2018-10-19 NOTE — PROGRESS NOTES
"Was shocked to hear that she was having a girl and really excited but was sure it was another boy so took a while to get used to the idea. Likely name is osmel but call her \"colltc\" wanted cuellar but  didn't like it  Feeling some possible very faint flutters already  Is having an all day yucky feeling. Not even typical nausea that zofran would help with like with the boys but just gross. Appetite is ok but generally stomach is bothering her. Is def constipated. Maybe going every 3 days and not much. Not taking anything. Encouraged senokot-s 1-2 daily and could add miralax as well  Return 3 weeks and offer AFP    "

## 2018-11-09 ENCOUNTER — PRENATAL OFFICE VISIT (OUTPATIENT)
Dept: OBGYN | Facility: CLINIC | Age: 33
End: 2018-11-09
Payer: COMMERCIAL

## 2018-11-09 VITALS — SYSTOLIC BLOOD PRESSURE: 108 MMHG | DIASTOLIC BLOOD PRESSURE: 72 MMHG | BODY MASS INDEX: 33.55 KG/M2 | WEIGHT: 211 LBS

## 2018-11-09 DIAGNOSIS — O21.9 NAUSEA AND VOMITING DURING PREGNANCY: ICD-10-CM

## 2018-11-09 DIAGNOSIS — O09.529 SUPERVISION OF HIGH-RISK PREGNANCY OF ELDERLY MULTIGRAVIDA: Primary | ICD-10-CM

## 2018-11-09 DIAGNOSIS — Z36.1 NEED FOR MATERNAL SERUM ALPHA-PROTEIN (MSAFP) SCREENING: ICD-10-CM

## 2018-11-09 PROCEDURE — 99000 SPECIMEN HANDLING OFFICE-LAB: CPT | Performed by: OBSTETRICS & GYNECOLOGY

## 2018-11-09 PROCEDURE — 36415 COLL VENOUS BLD VENIPUNCTURE: CPT | Performed by: OBSTETRICS & GYNECOLOGY

## 2018-11-09 PROCEDURE — 82105 ALPHA-FETOPROTEIN SERUM: CPT | Mod: 90 | Performed by: OBSTETRICS & GYNECOLOGY

## 2018-11-09 PROCEDURE — 99207 ZZC PRENATAL VISIT: CPT | Performed by: OBSTETRICS & GYNECOLOGY

## 2018-11-09 RX ORDER — ONDANSETRON 8 MG/1
8 TABLET, FILM COATED ORAL EVERY 8 HOURS PRN
Qty: 60 TABLET | Refills: 1 | Status: SHIPPED | OUTPATIENT
Start: 2018-11-09 | End: 2018-12-07

## 2018-11-09 NOTE — MR AVS SNAPSHOT
After Visit Summary   11/9/2018    Marivel Jones    MRN: [id number]           Patient Information     Date Of Birth          1985        Visit Information        Provider Department      11/9/2018 9:20 AM Karen Ngo MD [facility] [place]        Today's Diagnoses     Supervision of high-risk pregnancy of elderly multigravida    -  1    Need for maternal serum alpha-protein (MSAFP) screening        Nausea and vomiting during pregnancy           Follow-ups after your visit        Your next 10 appointments already scheduled     Dec 07, 2018  8:00 AM CST   US OB >14 WEEKS with WEUS2   [facility] [place] ([facility] [place])    [address] [place] [address] [phone]           How do I prepare for my exam? (Food and drink instructions) Drink four 8-ounce glasses of fluid an hour before your exam. If you need to empty your bladder before your exam, try to release only a little urine. Then, drink another glass of fluid.  How do I prepare for my exam? (Other instructions) You may have up to two family members in the exam room. If you bring a small child, an adult must be there to care for him or her. No video or camera photography during the procedure.  What should I wear: Wear comfortable clothes.  How long does the exam take: Most ultrasounds take 30 to 60 minutes.  What should I bring: Bring a list of your medicines, including vitamins, minerals and over-the-counter drugs. It is safest to leave personal items at home.  Do I need a :  No  is needed.  What do I need to tell my doctor: Tell your doctor about any allergies you may have.  What should I do after the exam: No restrictions, You may resume normal activities.  What is this test: An ultrasound uses sound waves to make pictures of the body. Sound waves do not cause pain. The only discomfort may be the pressure of the wand against your skin or full  bladder.  Who should I call with questions: If you have any questions, please call the Imaging Department where you will have your exam. Directions, parking instructions, and other information is available on our website, [email or web address].            Dec 07, 2018  8:50 AM CST   ESTABLISHED PRENATAL with Karen Ngo MD   [facility] [place] ([facility] [place])    [address] [place] [address] [address]  [place] [address]   [phone]            Jan 04, 2019  9:40 AM CST   ESTABLISHED PRENATAL with Karen Ngo MD   [facility] [place] ([facility] [place])    [address] [place] [address] [address]  [place] [address]   [phone]            Feb 01, 2019  2:50 PM CST   Glucose Tolerance Test with WE LAB   [facility] [place] ([facility] [place])    [address] [place] [address] [address]  [place] [address]   [phone]            Feb 01, 2019  3:10 PM CST   ESTABLISHED PRENATAL with Karen Ngo MD   [facility] [place] ([facility] [place])    [address] [place] [address] [address]  [place] [address]   [phone]              Future tests that were ordered for you today     Open Future Orders        Priority Expected Expires Ordered    US OB > 14 Weeks Routine  11/10/2019 11/9/2018            Who to contact     If you have questions or need follow up information about today's clinic visit or your schedule please contact [facility] [place] directly at [phone].  Normal or non-critical lab and imaging results will be communicated to you by MyChart, letter or phone within 4 business days after the clinic has received the results. If you do not hear from us within 7 days, please contact the clinic through MyChart or phone. If you have a critical or abnormal lab result, we will notify you by phone as soon as possible.  Submit refill requests through [employer] or call your pharmacy and they will  "forward the refill request to us. Please allow 3 business days for your refill to be completed.          Additional Information About Your Visit        Paren[employer]MS Information     [employer] Display System lets you send messages to your doctor, view your test results, renew your prescriptions, schedule appointments and more. To sign up, go to [email or web address] Display System . Click on \"Log in\" on the left side of the screen, which will take you to the Welcome page. Then click on \"Sign up Now\" on the right side of the page.     You will be asked to enter the access code listed below, as well as some personal information. Please follow the directions to create your username and password.     Your access code is: [email or web address]  Expires: 2018 11:43 AM     Your access code will  in 90 days. If you need help or a new code, please call your [facility] clinic or [phone].        Care EveryWhere ID     This is your Care EveryWhere ID. This could be used by other organizations to access your [facility] medical records  [id number]        Your Vitals Were     Last Period BMI (Body Mass Index)                07/15/2018 33.55 kg/m2           Blood Pressure from Last 3 Encounters:   18 108/72   10/19/18 104/70   18 123/75    Weight from Last 3 Encounters:   18 211 lb (95.7 kg)   10/19/18 207 lb (93.9 kg)   18 209 lb 3.2 oz (94.9 kg)              We Performed the Following     Alpha fetoprotein maternal screen          Where to get your medicines      These medications were sent to [facility] [address] [place] [facility] [place] [address]  [address] [place] [address]     Phone:  [phone]     ondansetron 8 MG tablet          Primary Care Provider Office Phone # Fax #    Karen Ngo [phone] [address] [facility] [address]  [facility] [address]        Equal Access to Services     KIRAN VENEGAS AH: Hadii felicia Armas, waaxda tyreseqashley, qaybta juan pollard, wendi alvarado " larichard newell. So [facility] [phone].    ATENCIÓN: Si habla nicola, tiene a saba disposición servicios gratuitos de asistencia lingüística. Steve al [phone].    We comply with applicable federal civil rights laws and [place] laws. We do not discriminate on the basis of race, color, national origin, age, disability, sex, sexual orientation, or gender identity.            Thank you!     Thank you for choosing [facility] [place]  for your care. Our goal is always to provide you with excellent care. Hearing back from our patients is one way we can continue to improve our services. Please take a few minutes to complete the written survey that you may receive in the mail after your visit with us. Thank you!             Your Updated Medication List - Protect others around you: Learn how to safely use, store and throw away your medicines at www.disposemymeds.org.          This list is accurate as of 11/9/18 11:59 PM.  Always use your most recent med list.                   Brand Name Dispense Instructions for use Diagnosis    ondansetron 8 MG tablet    ZOFRAN    60 tablet    Take 1 tablet (8 mg) by mouth every 8 hours as needed for nausea    Nausea and vomiting during pregnancy       PRENATAL VITAMINS PO      Take 1 tablet by mouth    Supervision of high-risk pregnancy of elderly multigravida, Genetic screening, Need for prophylactic vaccination and inoculation against influenza

## 2018-11-10 NOTE — PROGRESS NOTES
"Still really nauseous. Really peaked at 16 weeks actually  Doing just one zofran in the morning and that is really helping. Hasn't taken more than that.  Still contipated and no change there. Never really took any stool softener more than once or twice b/c afraid of \"blowout a work\"  Reassured taht miralax won't act that way. Senokot unlikely too but more of a chance so to try the former  Feeling a lot of flutters already  Needs refill on zofran  Discussed AFP for spina bifida screen and will do that today  Return 3 weeks with anatomy scan  "

## 2018-11-11 LAB
# FETUSES US: NORMAL
# FETUSES: NORMAL
AFP ADJ MOM AMN: 1.08
AFP SERPL-MCNC: 33 NG/ML
AGE - REPORTED: 35.7 YR
CURRENT SMOKER: NO
CURRENT SMOKER: NO
DIABETES STATUS PATIENT: NO
FAMILY MEMBER DISEASES HX: NO
FAMILY MEMBER DISEASES HX: NO
GA METHOD: NORMAL
GA METHOD: NORMAL
GA: NORMAL WK
IDDM PATIENT QL: NO
INTEGRATED SCN PATIENT-IMP: NORMAL
LMP START DATE: NORMAL
MONOCHORIONIC TWINS: NO
SERVICE CMNT-IMP: NO
SPECIMEN DRAWN SERPL: NORMAL
VALPROIC/CARBAMAZEPINE STATUS: NO
WEIGHT UNITS: NORMAL

## 2018-12-07 ENCOUNTER — PRENATAL OFFICE VISIT (OUTPATIENT)
Dept: OBGYN | Facility: CLINIC | Age: 33
End: 2018-12-07
Payer: COMMERCIAL

## 2018-12-07 ENCOUNTER — RADIANT APPOINTMENT (OUTPATIENT)
Dept: ULTRASOUND IMAGING | Facility: CLINIC | Age: 33
End: 2018-12-07
Payer: COMMERCIAL

## 2018-12-07 VITALS — WEIGHT: 217 LBS | DIASTOLIC BLOOD PRESSURE: 64 MMHG | SYSTOLIC BLOOD PRESSURE: 112 MMHG | BODY MASS INDEX: 34.5 KG/M2

## 2018-12-07 DIAGNOSIS — O09.529 SUPERVISION OF HIGH-RISK PREGNANCY OF ELDERLY MULTIGRAVIDA: Primary | ICD-10-CM

## 2018-12-07 DIAGNOSIS — O09.529 SUPERVISION OF HIGH-RISK PREGNANCY OF ELDERLY MULTIGRAVIDA: ICD-10-CM

## 2018-12-07 DIAGNOSIS — O21.9 VOMITING OR NAUSEA OF PREGNANCY: ICD-10-CM

## 2018-12-07 PROCEDURE — 76805 OB US >/= 14 WKS SNGL FETUS: CPT | Performed by: OBSTETRICS & GYNECOLOGY

## 2018-12-07 PROCEDURE — 99207 ZZC PRENATAL VISIT: CPT | Performed by: OBSTETRICS & GYNECOLOGY

## 2018-12-07 RX ORDER — METOCLOPRAMIDE 10 MG/1
10 TABLET ORAL 4 TIMES DAILY PRN
Qty: 40 TABLET | Refills: 1 | Status: SHIPPED | OUTPATIENT
Start: 2018-12-07 | End: 2018-12-19

## 2018-12-07 RX ORDER — ONDANSETRON 8 MG/1
8 TABLET, FILM COATED ORAL EVERY 8 HOURS PRN
Qty: 60 TABLET | Refills: 1 | Status: SHIPPED | OUTPATIENT
Start: 2018-12-07 | End: 2019-05-08

## 2018-12-07 NOTE — MR AVS SNAPSHOT
After Visit Summary   12/7/2018    Marivel Jones    MRN: [id number]           Patient Information     Date Of Birth          1985        Visit Information        Provider Department      12/7/2018 8:50 AM Karen Ngo MD [facility] [place]        Today's Diagnoses     Supervision of high-risk pregnancy of elderly multigravida    -  1    Vomiting or nausea of pregnancy           Follow-ups after your visit        Your next 10 appointments already scheduled     Jan 04, 2019  9:40 AM CST   ESTABLISHED PRENATAL with Karen Ngo MD   [facility] [place] ([facility] [place])    [address] [place] [address] [address]  [place] [address]   [phone]            Feb 01, 2019  2:50 PM CST   Glucose Tolerance Test with WE LAB   [facility] [place] ([facility] [place])    [address] [place] [address] [address]  [place] [address]   [phone]            Feb 01, 2019  3:10 PM CST   ESTABLISHED PRENATAL with Karen Ngo MD   [facility] [place] ([facility] [place])    [address] [place] [address] [address]  [place] [address]   [phone]            Feb 13, 2019  8:30 AM CST   ESTABLISHED PRENATAL with Karen Ngo MD   [facility] [place] ([facility] [place])    [address] [place] [address] [address]  [place] [address]   [phone]            Mar 01, 2019  8:30 AM CST   ESTABLISHED PRENATAL with Karen Ngo MD   [facility] [place] ([facility] [place])    [address] [place] [address] [address]  [place] [address]   [phone]              Who to contact     If you have questions or need follow up information about today's clinic visit or your schedule please contact [facility] [place] directly at [phone].  Normal or non-critical lab and imaging results will be communicated to you by MyChart, letter or phone within 4 business days  after the clinic has received the results. If you do not hear from us within 7 days, please contact the clinic through [employer] or phone. If you have a critical or abnormal lab result, we will notify you by phone as soon as possible.  Submit refill requests through [employer] or call your pharmacy and they will forward the refill request to us. Please allow 3 business days for your refill to be completed.          Additional Information About Your Visit        Care EveryWhere ID     This is your Care EveryWhere ID. This could be used by other organizations to access your [facility] medical records  [id number]        Your Vitals Were     Last Period BMI (Body Mass Index)                07/15/2018 34.5 kg/m2           Blood Pressure from Last 3 Encounters:   12/07/18 112/64   11/09/18 108/72   10/19/18 104/70    Weight from Last 3 Encounters:   12/07/18 217 lb (98.4 kg)   11/09/18 211 lb (95.7 kg)   10/19/18 207 lb (93.9 kg)              Today, you had the following     No orders found for display         Today's Medication Changes          These changes are accurate as of 12/7/18 11:59 PM.  If you have any questions, ask your nurse or doctor.               Start taking these medicines.        Dose/Directions    metoclopramide 10 MG tablet   Commonly known as:  REGLAN   Used for:  Vomiting or nausea of pregnancy   Started by:  Karen Ngo MD        Dose:  10 mg   Take 1 tablet (10 mg) by mouth 4 times daily as needed (nausea)   Quantity:  40 tablet   Refills:  1            Where to get your medicines      These medications were sent to [place] [address] [place] [facility] [place] [facility] [address] [facility]  [address] [place] [address]     Phone:  [phone]     metoclopramide 10 MG tablet    ondansetron 8 MG tablet                Primary Care Provider Office Phone # Fax #    Karen Ngo [phone] [address] [place] [address]  [place] [address]        Equal Access to Services     KIRAN VENEGAS AH: [place] meek  margarette Armas, willada luerinadaha, qaybta kacleopatra pollard, wendi kevinin hayaayary hopkinsrickie carmelotanika laYarycindi reece. So [facility] [phone].    ATENCIÓN: Si habla español, tiene a saba disposición servicios gratuitos de asistencia lingüística. Steve al [phone].    We comply with applicable federal civil rights laws and [place] laws. We do not discriminate on the basis of race, color, national origin, age, disability, sex, sexual orientation, or gender identity.            Thank you!     Thank you for choosing [facility] [place]  for your care. Our goal is always to provide you with excellent care. Hearing back from our patients is one way we can continue to improve our services. Please take a few minutes to complete the written survey that you may receive in the mail after your visit with us. Thank you!             Your Updated Medication List - Protect others around you: Learn how to safely use, store and throw away your medicines at www.disposemymeds.org.          This list is accurate as of 12/7/18 11:59 PM.  Always use your most recent med list.                   Brand Name Dispense Instructions for use Diagnosis    metoclopramide 10 MG tablet    REGLAN    40 tablet    Take 1 tablet (10 mg) by mouth 4 times daily as needed (nausea)    Vomiting or nausea of pregnancy       ondansetron 8 MG tablet    ZOFRAN    60 tablet    Take 1 tablet (8 mg) by mouth every 8 hours as needed for nausea    Vomiting or nausea of pregnancy       PRENATAL VITAMINS PO      Take 1 tablet by mouth    Supervision of high-risk pregnancy of elderly multigravida, Genetic screening, Need for prophylactic vaccination and inoculation against influenza

## 2018-12-08 NOTE — PROGRESS NOTES
Normal anatomy U/S. Posterior placenta. Feeling a lot of FM and has for a month already  Still nauseous all the time with rare vomiting. Feels like eating and snacking on carbs all day long to keep the nausea at bay  The zofran is definitely helping and takes one every morning on way to work. Taking 8mg. Would be willing to take 4mg b/c is definitely constipated.   Encouraged to stay on 8mg and consider taking it BID and then doing miralax every single day to keep constipation under control  Also rx'd reglan. Cautioned it may cause fatigue and drowsiness so to trial it at home on weekend or evening. Could alternate the two. rx of both sent in for her  Return 4 weeks.

## 2018-12-19 DIAGNOSIS — O21.9 VOMITING OR NAUSEA OF PREGNANCY: ICD-10-CM

## 2018-12-19 RX ORDER — METOCLOPRAMIDE 10 MG/1
10 TABLET ORAL 4 TIMES DAILY PRN
Qty: 90 TABLET | Refills: 0 | Status: SHIPPED | OUTPATIENT
Start: 2018-12-19 | End: 2019-02-01

## 2018-12-19 NOTE — TELEPHONE ENCOUNTER
"Requested Prescriptions   Pending Prescriptions Disp Refills     metoclopramide (REGLAN) 10 MG tablet 90 tablet 1     Sig: Take 1 tablet (10 mg) by mouth 4 times daily as needed (nausea)     Antivertigo/Antiemetic Agents Passed - 12/19/2018  7:45 AM       Passed - Recent (12 mo) or future (30 days) visit within the authorizing provider's specialty    Patient had office visit in the last 12 months or has a visit in the next 30 days with authorizing provider or within the authorizing provider's specialty.  See \"Patient Info\" tab in inbasket, or \"Choose Columns\" in Meds & Orders section of the refill encounter.             Passed - Patient is 18 years of age or older        Last Written Prescription Date:  12/07/18  Last Fill Quantity: 40,  # refills: 1   Last office visit: 12/7/2018 with prescribing provider:  Dr Ngo   Future Office Visit:   Next 5 appointments (look out 90 days)    Jan 04, 2019  9:40 AM CST  ESTABLISHED PRENATAL with Karen Ngo MD  [facility] [place] ([facility] [place]) [address] [place] [address]  [phone]   Feb 01, 2019  3:10 PM CST  ESTABLISHED PRENATAL with Karen Ngo MD  [facility] [place] ([facility] [place]) [address] [place] [address]  [phone]   Feb 13, 2019  8:30 AM CST  ESTABLISHED PRENATAL with Karen Ngo MD  [facility] [place] ([facility] [place]) [address] [place] [address]  [phone]   Mar 01, 2019  8:30 AM CST  ESTABLISHED PRENATAL with Karen Ngo MD  [facility] [place] ([facility] [place]) [address] [address]  [phone]           "

## 2019-01-04 ENCOUNTER — PRENATAL OFFICE VISIT (OUTPATIENT)
Dept: OBGYN | Facility: CLINIC | Age: 34
End: 2019-01-04
Payer: COMMERCIAL

## 2019-01-04 VITALS — DIASTOLIC BLOOD PRESSURE: 74 MMHG | WEIGHT: 228 LBS | SYSTOLIC BLOOD PRESSURE: 116 MMHG | BODY MASS INDEX: 36.25 KG/M2

## 2019-01-04 DIAGNOSIS — O09.529 SUPERVISION OF HIGH-RISK PREGNANCY OF ELDERLY MULTIGRAVIDA: Primary | ICD-10-CM

## 2019-01-04 DIAGNOSIS — E03.8 SUBCLINICAL HYPOTHYROIDISM: ICD-10-CM

## 2019-01-04 PROCEDURE — 99207 ZZC PRENATAL VISIT: CPT | Performed by: OBSTETRICS & GYNECOLOGY

## 2019-01-04 NOTE — PROGRESS NOTES
Nausea is definitely better. Now taking a zofran every 3 days or so rather than 2x/day every day. gaggy in the morning and then just feeling icky so feels like eating way more carbs  Upset by rapid weight gain after worked so hard to lose but just feels like nothing sounds good but carbs right now. Cautioned to be watchful/careful for risk of GDM, etc  Tons of FM but all feels really low  Fh is much larger than dates but some of that is body habitus as well  Return 4 weeks with GCT, hgb and tdap as well as TSH with reflex given her h.o subclinical hypothyroid in past but never on meds

## 2019-01-23 ENCOUNTER — TELEPHONE (OUTPATIENT)
Dept: OBGYN | Facility: CLINIC | Age: 34
End: 2019-01-23

## 2019-01-23 DIAGNOSIS — Z23 NEED FOR TDAP VACCINATION: ICD-10-CM

## 2019-01-23 DIAGNOSIS — E03.8 SUBCLINICAL HYPOTHYROIDISM: Primary | ICD-10-CM

## 2019-01-23 DIAGNOSIS — Z36.9 ENCOUNTER FOR ANTENATAL SCREENING OF MOTHER: ICD-10-CM

## 2019-01-23 NOTE — TELEPHONE ENCOUNTER
Pt 28 wks planning a trip to [place]. Reassured pt ok to travel to [place] via plane, reminded pt to get up and walk about half way through her flight. Pt will be going before 34 wks. No further questions, concerns.  Sol Carpenter RN on 1/23/2019 at 10:39 AM

## 2019-02-01 ENCOUNTER — PRENATAL OFFICE VISIT (OUTPATIENT)
Dept: OBGYN | Facility: CLINIC | Age: 34
End: 2019-02-01
Payer: COMMERCIAL

## 2019-02-01 VITALS — DIASTOLIC BLOOD PRESSURE: 64 MMHG | WEIGHT: 234.4 LBS | SYSTOLIC BLOOD PRESSURE: 122 MMHG | BODY MASS INDEX: 37.27 KG/M2

## 2019-02-01 DIAGNOSIS — Z36.9 ENCOUNTER FOR ANTENATAL SCREENING OF MOTHER: ICD-10-CM

## 2019-02-01 DIAGNOSIS — Z23 NEED FOR TDAP VACCINATION: ICD-10-CM

## 2019-02-01 DIAGNOSIS — O09.529 SUPERVISION OF HIGH-RISK PREGNANCY OF ELDERLY MULTIGRAVIDA: Primary | ICD-10-CM

## 2019-02-01 DIAGNOSIS — O09.529 SUPERVISION OF HIGH-RISK PREGNANCY OF ELDERLY MULTIGRAVIDA: ICD-10-CM

## 2019-02-01 DIAGNOSIS — E03.8 SUBCLINICAL HYPOTHYROIDISM: ICD-10-CM

## 2019-02-01 DIAGNOSIS — Z36.9 ANTENATAL SCREENING ENCOUNTER: ICD-10-CM

## 2019-02-01 LAB
GLUCOSE 1H P 50 G GLC PO SERPL-MCNC: 133 MG/DL (ref 60–129)
HGB BLD-MCNC: 12.1 G/DL (ref 11.7–15.7)

## 2019-02-01 PROCEDURE — 00000218 ZZHCL STATISTIC OBHBG - HEMOGLOBIN: Performed by: OBSTETRICS & GYNECOLOGY

## 2019-02-01 PROCEDURE — 84443 ASSAY THYROID STIM HORMONE: CPT | Performed by: OBSTETRICS & GYNECOLOGY

## 2019-02-01 PROCEDURE — 82950 GLUCOSE TEST: CPT | Performed by: OBSTETRICS & GYNECOLOGY

## 2019-02-01 PROCEDURE — 36415 COLL VENOUS BLD VENIPUNCTURE: CPT | Performed by: OBSTETRICS & GYNECOLOGY

## 2019-02-01 PROCEDURE — 99207 ZZC PRENATAL VISIT: CPT | Performed by: OBSTETRICS & GYNECOLOGY

## 2019-02-01 PROCEDURE — 86780 TREPONEMA PALLIDUM: CPT | Performed by: OBSTETRICS & GYNECOLOGY

## 2019-02-01 PROCEDURE — 90471 IMMUNIZATION ADMIN: CPT

## 2019-02-01 PROCEDURE — 90715 TDAP VACCINE 7 YRS/> IM: CPT

## 2019-02-01 NOTE — PROGRESS NOTES
FH is much more on track now than last time  Tons of FM  Lots of vaginal pressure and achiness and some BH but not bad  Weight gain was again fast and admits to eating a lot of carbs b/c of not feeling hungry for anything else.  Now her nausea is hitting at 3am. Is not feeling shaky but really nauseous and going downstairs and having toast and zofran at same time. So not sure which is helping. Is usually having a high carb/sweet at 830 before bed and nothing after that.  Discussed that could be hypoglycemia. Encouraged to do a greek yogurt or something with protein and carbs together before bed  Also bad GERD. Eating tons of tums. Encouraged to do prilosec daily as that could cause middle of the night nausea too  GCT, hgb, TSH, syphyllis testing today  Return 2 weeks.

## 2019-02-01 NOTE — PROGRESS NOTES
Screening Questionnaire for Adult Immunization    Are you sick today?   No   Do you have allergies to medications, food, a vaccine component or latex?   No   Have you ever had a serious reaction after receiving a vaccination?   No   Do you have a long-term health problem with heart disease, lung disease, asthma, kidney disease, metabolic disease (e.g. diabetes), anemia, or other blood disorder?   No   Do you have cancer, leukemia, HIV/AIDS, or any other immune system problem?   No   In the past 3 months, have you taken medications that affect  your immune system, such as prednisone, other steroids, or anticancer drugs; drugs for the treatment of rheumatoid arthritis, Crohn s disease, or psoriasis; or have you had radiation treatments?   No   Have you had a seizure, or a brain or other nervous system problem?   No   During the past year, have you received a transfusion of blood or blood     products, or been given immune (gamma) globulin or antiviral drug?   No   For women: Are you pregnant or is there a chance you could become        pregnant during the next month?   No   Have you received any vaccinations in the past 4 weeks?   No     Immunization questionnaire answers were all negative.        Per orders of Dr. Ngo, injection of Tdap given by Galina Glover. Patient instructed to remain in clinic for 15 minutes afterwards, and to report any adverse reaction to me immediately.       Screening performed by Galina Glover on 2/1/2019 at 3:51 PM.

## 2019-02-04 LAB — TSH SERPL DL<=0.005 MIU/L-ACNC: 1.54 MU/L (ref 0.4–4)

## 2019-02-05 LAB — T PALLIDUM AB SER QL: NONREACTIVE

## 2019-02-13 ENCOUNTER — PRENATAL OFFICE VISIT (OUTPATIENT)
Dept: OBGYN | Facility: CLINIC | Age: 34
End: 2019-02-13
Payer: COMMERCIAL

## 2019-02-13 VITALS — SYSTOLIC BLOOD PRESSURE: 110 MMHG | BODY MASS INDEX: 37.36 KG/M2 | DIASTOLIC BLOOD PRESSURE: 62 MMHG | WEIGHT: 235 LBS

## 2019-02-13 DIAGNOSIS — O09.523 SUPERVISION OF ELDERLY MULTIGRAVIDA IN THIRD TRIMESTER: Primary | ICD-10-CM

## 2019-02-13 PROCEDURE — 99207 ZZC PRENATAL VISIT: CPT | Performed by: OBSTETRICS & GYNECOLOGY

## 2019-02-13 RX ORDER — OMEPRAZOLE 20 MG/1
20 TABLET, DELAYED RELEASE ORAL DAILY
Qty: 90 TABLET | Refills: 3 | COMMUNITY
Start: 2019-02-13 | End: 2019-05-08

## 2019-02-13 NOTE — PROGRESS NOTES
"Still some occasional nausea here and there but did start on prilosec and it's been \"magic\". No reflux, no indigestion, burping, etc  Tons of FM all the time  Some mild BH but actually fewer than with the boys  Much less swelling this preg than last. Weight gain has been much slower/better the last few weeks which is good  FH is about 2 cm large so about what she's been doing.  Growth U/s is scheduled for 3/15.  Return 2 weeks for reg appt  "

## 2019-03-01 ENCOUNTER — PRENATAL OFFICE VISIT (OUTPATIENT)
Dept: OBGYN | Facility: CLINIC | Age: 34
End: 2019-03-01
Payer: COMMERCIAL

## 2019-03-01 VITALS — SYSTOLIC BLOOD PRESSURE: 116 MMHG | BODY MASS INDEX: 38.16 KG/M2 | WEIGHT: 240 LBS | DIASTOLIC BLOOD PRESSURE: 64 MMHG

## 2019-03-01 DIAGNOSIS — O09.529 SUPERVISION OF HIGH-RISK PREGNANCY OF ELDERLY MULTIGRAVIDA: Primary | ICD-10-CM

## 2019-03-01 DIAGNOSIS — E03.8 SUBCLINICAL HYPOTHYROIDISM: ICD-10-CM

## 2019-03-01 DIAGNOSIS — O26.843 UTERINE SIZE-DATE DISCREPANCY IN THIRD TRIMESTER: ICD-10-CM

## 2019-03-01 PROCEDURE — 99207 ZZC PRENATAL VISIT: CPT | Performed by: OBSTETRICS & GYNECOLOGY

## 2019-03-01 NOTE — PROGRESS NOTES
For the most part feeling good  Baby is really active and more than the boys were  Feels like definitely looking really big to everyone though FH today is again 2 cm ahead like she has been. Some is body habitus as well  Some minor swelling. BP great. 1+ prot in urine today likely a contaminant  Plan growth U/S in 2 weeks given larger FH and larger size for boys  Discussed PP depression risk. Had it with [facility] but not [facility]. On meds for a few months once we figured it out. Discussed starting them preemptively vs just watching for sx. Discussed PP estrogen supplementing as well for hormonal low mood. At this point will just monitor for PP sx and start meds if any indication to do so

## 2019-03-15 ENCOUNTER — PRENATAL OFFICE VISIT (OUTPATIENT)
Dept: OBGYN | Facility: CLINIC | Age: 34
End: 2019-03-15
Payer: COMMERCIAL

## 2019-03-15 ENCOUNTER — ANCILLARY PROCEDURE (OUTPATIENT)
Dept: ULTRASOUND IMAGING | Facility: CLINIC | Age: 34
End: 2019-03-15
Payer: COMMERCIAL

## 2019-03-15 VITALS — WEIGHT: 241 LBS | BODY MASS INDEX: 38.32 KG/M2 | DIASTOLIC BLOOD PRESSURE: 76 MMHG | SYSTOLIC BLOOD PRESSURE: 114 MMHG

## 2019-03-15 DIAGNOSIS — O26.843 UTERINE SIZE-DATE DISCREPANCY IN THIRD TRIMESTER: ICD-10-CM

## 2019-03-15 DIAGNOSIS — O09.529 SUPERVISION OF HIGH-RISK PREGNANCY OF ELDERLY MULTIGRAVIDA: Primary | ICD-10-CM

## 2019-03-15 DIAGNOSIS — G25.81 RESTLESS LEG SYNDROME: ICD-10-CM

## 2019-03-15 LAB
ERYTHROCYTE [DISTWIDTH] IN BLOOD BY AUTOMATED COUNT: 13.7 % (ref 10–15)
HCT VFR BLD AUTO: 36.1 % (ref 35–47)
HGB BLD-MCNC: 12.4 G/DL (ref 11.7–15.7)
MCH RBC QN AUTO: 30.2 PG (ref 26.5–33)
MCHC RBC AUTO-ENTMCNC: 34.3 G/DL (ref 31.5–36.5)
MCV RBC AUTO: 88 FL (ref 78–100)
PLATELET # BLD AUTO: 256 10E9/L (ref 150–450)
RBC # BLD AUTO: 4.1 10E12/L (ref 3.8–5.2)
WBC # BLD AUTO: 11.7 10E9/L (ref 4–11)

## 2019-03-15 PROCEDURE — 82728 ASSAY OF FERRITIN: CPT | Performed by: OBSTETRICS & GYNECOLOGY

## 2019-03-15 PROCEDURE — 85027 COMPLETE CBC AUTOMATED: CPT | Performed by: OBSTETRICS & GYNECOLOGY

## 2019-03-15 PROCEDURE — 36415 COLL VENOUS BLD VENIPUNCTURE: CPT | Performed by: OBSTETRICS & GYNECOLOGY

## 2019-03-15 PROCEDURE — 83550 IRON BINDING TEST: CPT | Performed by: OBSTETRICS & GYNECOLOGY

## 2019-03-15 PROCEDURE — 76816 OB US FOLLOW-UP PER FETUS: CPT | Performed by: OBSTETRICS & GYNECOLOGY

## 2019-03-15 PROCEDURE — 99207 ZZC PRENATAL VISIT: CPT | Performed by: OBSTETRICS & GYNECOLOGY

## 2019-03-15 PROCEDURE — 83540 ASSAY OF IRON: CPT | Performed by: OBSTETRICS & GYNECOLOGY

## 2019-03-15 NOTE — PROGRESS NOTES
Growth scan done b/c FH is so large and looks so big. Measuring right on track at 6#-52%. 32/10/83/37. GOKUL is normal at 16.5 so on track to be right around 8# and likely a bit smaller than the boys  Tons of FM  Some Bh but really more of an issue is that has tons of vaginal and pelvic floor pressure. Discussed reasons for this  No VB or LOF  Having horrible restless legs and can hardly get to sleep b/c of it. Hasn't tried benadryl yet but think she may have to  Discussed iron deficiency causing it as well. hgb at 28 weeks was fine. Will check hgb and iron studies and if iron is low could do venofer x1-3 times as that will help with RLS as well  At this point has an appointment in about 12 days so will just keep that and do the GBS at that point.  If anything of concern comes up next week will add an extra appointment in.

## 2019-03-16 LAB
FERRITIN SERPL-MCNC: 19 NG/ML (ref 12–150)
IRON SATN MFR SERPL: 17 % (ref 15–46)
IRON SERPL-MCNC: 84 UG/DL (ref 35–180)
TIBC SERPL-MCNC: 484 UG/DL (ref 240–430)

## 2019-03-27 ENCOUNTER — ANESTHESIA EVENT (OUTPATIENT)
Dept: OBGYN | Facility: CLINIC | Age: 34
End: 2019-03-27
Payer: COMMERCIAL

## 2019-03-27 ENCOUNTER — ANESTHESIA (OUTPATIENT)
Dept: OBGYN | Facility: CLINIC | Age: 34
End: 2019-03-27
Payer: COMMERCIAL

## 2019-03-27 ENCOUNTER — HOSPITAL ENCOUNTER (INPATIENT)
Facility: CLINIC | Age: 34
LOS: 1 days | Discharge: HOME | End: 2019-03-28
Attending: OBSTETRICS & GYNECOLOGY | Admitting: OBSTETRICS & GYNECOLOGY
Payer: COMMERCIAL

## 2019-03-27 ENCOUNTER — NURSE TRIAGE (OUTPATIENT)
Dept: NURSING | Facility: CLINIC | Age: 34
End: 2019-03-27

## 2019-03-27 PROBLEM — Z34.90 PREGNANCY: Status: ACTIVE | Noted: 2019-03-27

## 2019-03-27 PROBLEM — Z36.89 ENCOUNTER FOR TRIAGE IN PREGNANT PATIENT: Status: ACTIVE | Noted: 2019-03-27

## 2019-03-27 LAB
ABO + RH BLD: NORMAL
ABO + RH BLD: NORMAL
BLD GP AB SCN SERPL QL: NORMAL
BLOOD BANK CMNT PATIENT-IMP: NORMAL
RUPTURE OF FETAL MEMBRANES BY ROM PLUS: NEGATIVE
RUPTURE OF FETAL MEMBRANES BY ROM PLUS: NEGATIVE
SPECIMEN EXP DATE BLD: NORMAL
T PALLIDUM AB SER QL: NONREACTIVE

## 2019-03-27 PROCEDURE — 0KQM0ZZ REPAIR PERINEUM MUSCLE, OPEN APPROACH: ICD-10-PCS | Performed by: OBSTETRICS & GYNECOLOGY

## 2019-03-27 PROCEDURE — 25000128 H RX IP 250 OP 636: Performed by: SURGERY

## 2019-03-27 PROCEDURE — 84112 EVAL AMNIOTIC FLUID PROTEIN: CPT | Performed by: OBSTETRICS & GYNECOLOGY

## 2019-03-27 PROCEDURE — 25000132 ZZH RX MED GY IP 250 OP 250 PS 637: Performed by: OBSTETRICS & GYNECOLOGY

## 2019-03-27 PROCEDURE — 25000125 ZZHC RX 250: Performed by: OBSTETRICS & GYNECOLOGY

## 2019-03-27 PROCEDURE — 36415 COLL VENOUS BLD VENIPUNCTURE: CPT | Performed by: OBSTETRICS & GYNECOLOGY

## 2019-03-27 PROCEDURE — 25800030 ZZH RX IP 258 OP 636: Performed by: OBSTETRICS & GYNECOLOGY

## 2019-03-27 PROCEDURE — 59400 OBSTETRICAL CARE: CPT | Performed by: OBSTETRICS & GYNECOLOGY

## 2019-03-27 PROCEDURE — 87653 STREP B DNA AMP PROBE: CPT | Performed by: OBSTETRICS & GYNECOLOGY

## 2019-03-27 PROCEDURE — 86901 BLOOD TYPING SEROLOGIC RH(D): CPT | Performed by: OBSTETRICS & GYNECOLOGY

## 2019-03-27 PROCEDURE — 3E0R3BZ INTRODUCTION OF ANESTHETIC AGENT INTO SPINAL CANAL, PERCUTANEOUS APPROACH: ICD-10-PCS | Performed by: ANESTHESIOLOGY

## 2019-03-27 PROCEDURE — 25000125 ZZHC RX 250: Performed by: SURGERY

## 2019-03-27 PROCEDURE — 59025 FETAL NON-STRESS TEST: CPT

## 2019-03-27 PROCEDURE — 25000128 H RX IP 250 OP 636: Performed by: ANESTHESIOLOGY

## 2019-03-27 PROCEDURE — 27110038 ZZH RX 271: Performed by: SURGERY

## 2019-03-27 PROCEDURE — 86850 RBC ANTIBODY SCREEN: CPT | Performed by: OBSTETRICS & GYNECOLOGY

## 2019-03-27 PROCEDURE — 00HU33Z INSERTION OF INFUSION DEVICE INTO SPINAL CANAL, PERCUTANEOUS APPROACH: ICD-10-PCS | Performed by: ANESTHESIOLOGY

## 2019-03-27 PROCEDURE — 37000011 ZZH ANESTHESIA WARD SERVICE

## 2019-03-27 PROCEDURE — 86900 BLOOD TYPING SEROLOGIC ABO: CPT | Performed by: OBSTETRICS & GYNECOLOGY

## 2019-03-27 PROCEDURE — G0463 HOSPITAL OUTPT CLINIC VISIT: HCPCS | Mod: 25

## 2019-03-27 PROCEDURE — 72200001 ZZH LABOR CARE VAGINAL DELIVERY SINGLE

## 2019-03-27 PROCEDURE — 25000128 H RX IP 250 OP 636: Performed by: OBSTETRICS & GYNECOLOGY

## 2019-03-27 PROCEDURE — 25000125 ZZHC RX 250: Performed by: ANESTHESIOLOGY

## 2019-03-27 PROCEDURE — 0064U ANTB TP TOTAL&RPR IA QUAL: CPT | Performed by: OBSTETRICS & GYNECOLOGY

## 2019-03-27 PROCEDURE — 12000035 ZZH R&B POSTPARTUM

## 2019-03-27 RX ORDER — NALOXONE HYDROCHLORIDE 0.4 MG/ML
.1-.4 INJECTION, SOLUTION INTRAMUSCULAR; INTRAVENOUS; SUBCUTANEOUS
Status: DISCONTINUED | OUTPATIENT
Start: 2019-03-27 | End: 2019-03-27

## 2019-03-27 RX ORDER — NALBUPHINE HYDROCHLORIDE 10 MG/ML
2.5-5 INJECTION, SOLUTION INTRAMUSCULAR; INTRAVENOUS; SUBCUTANEOUS EVERY 6 HOURS PRN
Status: DISCONTINUED | OUTPATIENT
Start: 2019-03-27 | End: 2019-03-27

## 2019-03-27 RX ORDER — LANOLIN 100 %
OINTMENT (GRAM) TOPICAL
Status: DISCONTINUED | OUTPATIENT
Start: 2019-03-27 | End: 2019-03-28 | Stop reason: HOSPADM

## 2019-03-27 RX ORDER — IBUPROFEN 400 MG/1
800 TABLET, FILM COATED ORAL EVERY 6 HOURS PRN
Status: DISCONTINUED | OUTPATIENT
Start: 2019-03-27 | End: 2019-03-28 | Stop reason: HOSPADM

## 2019-03-27 RX ORDER — OXYTOCIN/0.9 % SODIUM CHLORIDE 30/500 ML
100-340 PLASTIC BAG, INJECTION (ML) INTRAVENOUS CONTINUOUS PRN
Status: DISCONTINUED | OUTPATIENT
Start: 2019-03-27 | End: 2019-03-27

## 2019-03-27 RX ORDER — LIDOCAINE 40 MG/G
CREAM TOPICAL
Status: DISCONTINUED | OUTPATIENT
Start: 2019-03-27 | End: 2019-03-27

## 2019-03-27 RX ORDER — OXYTOCIN/0.9 % SODIUM CHLORIDE 30/500 ML
340 PLASTIC BAG, INJECTION (ML) INTRAVENOUS CONTINUOUS PRN
Status: DISCONTINUED | OUTPATIENT
Start: 2019-03-27 | End: 2019-03-28 | Stop reason: HOSPADM

## 2019-03-27 RX ORDER — OXYTOCIN 10 [USP'U]/ML
10 INJECTION, SOLUTION INTRAMUSCULAR; INTRAVENOUS
Status: DISCONTINUED | OUTPATIENT
Start: 2019-03-27 | End: 2019-03-28 | Stop reason: HOSPADM

## 2019-03-27 RX ORDER — EPHEDRINE SULFATE 50 MG/ML
5 INJECTION, SOLUTION INTRAMUSCULAR; INTRAVENOUS; SUBCUTANEOUS
Status: DISCONTINUED | OUTPATIENT
Start: 2019-03-27 | End: 2019-03-27

## 2019-03-27 RX ORDER — ONDANSETRON 2 MG/ML
4 INJECTION INTRAMUSCULAR; INTRAVENOUS EVERY 6 HOURS PRN
Status: DISCONTINUED | OUTPATIENT
Start: 2019-03-27 | End: 2019-03-27

## 2019-03-27 RX ORDER — NALOXONE HYDROCHLORIDE 0.4 MG/ML
.1-.4 INJECTION, SOLUTION INTRAMUSCULAR; INTRAVENOUS; SUBCUTANEOUS
Status: DISCONTINUED | OUTPATIENT
Start: 2019-03-27 | End: 2019-03-28 | Stop reason: HOSPADM

## 2019-03-27 RX ORDER — AMOXICILLIN 250 MG
1 CAPSULE ORAL 2 TIMES DAILY
Status: DISCONTINUED | OUTPATIENT
Start: 2019-03-27 | End: 2019-03-28 | Stop reason: HOSPADM

## 2019-03-27 RX ORDER — PRENATAL VIT/IRON FUM/FOLIC AC 27MG-0.8MG
1 TABLET ORAL AT BEDTIME
Status: DISCONTINUED | OUTPATIENT
Start: 2019-03-27 | End: 2019-03-28 | Stop reason: HOSPADM

## 2019-03-27 RX ORDER — BISACODYL 10 MG
10 SUPPOSITORY, RECTAL RECTAL DAILY PRN
Status: DISCONTINUED | OUTPATIENT
Start: 2019-03-29 | End: 2019-03-28 | Stop reason: HOSPADM

## 2019-03-27 RX ORDER — METHYLERGONOVINE MALEATE 0.2 MG/ML
200 INJECTION INTRAVENOUS
Status: DISCONTINUED | OUTPATIENT
Start: 2019-03-27 | End: 2019-03-27

## 2019-03-27 RX ORDER — ROPIVACAINE HYDROCHLORIDE 2 MG/ML
10 INJECTION, SOLUTION EPIDURAL; INFILTRATION; PERINEURAL ONCE
Status: COMPLETED | OUTPATIENT
Start: 2019-03-27 | End: 2019-03-27

## 2019-03-27 RX ORDER — IBUPROFEN 400 MG/1
800 TABLET, FILM COATED ORAL
Status: DISCONTINUED | OUTPATIENT
Start: 2019-03-27 | End: 2019-03-27

## 2019-03-27 RX ORDER — PENICILLIN G POTASSIUM 5000000 [IU]/1
5 INJECTION, POWDER, FOR SOLUTION INTRAMUSCULAR; INTRAVENOUS ONCE
Status: COMPLETED | OUTPATIENT
Start: 2019-03-27 | End: 2019-03-27

## 2019-03-27 RX ORDER — OXYTOCIN 10 [USP'U]/ML
10 INJECTION, SOLUTION INTRAMUSCULAR; INTRAVENOUS
Status: DISCONTINUED | OUTPATIENT
Start: 2019-03-27 | End: 2019-03-27

## 2019-03-27 RX ORDER — ACETAMINOPHEN 325 MG/1
650 TABLET ORAL EVERY 4 HOURS PRN
Status: DISCONTINUED | OUTPATIENT
Start: 2019-03-27 | End: 2019-03-28 | Stop reason: HOSPADM

## 2019-03-27 RX ORDER — OXYCODONE HYDROCHLORIDE 5 MG/1
5 TABLET ORAL EVERY 4 HOURS PRN
Status: DISCONTINUED | OUTPATIENT
Start: 2019-03-27 | End: 2019-03-28 | Stop reason: HOSPADM

## 2019-03-27 RX ORDER — AMOXICILLIN 250 MG
2 CAPSULE ORAL 2 TIMES DAILY
Status: DISCONTINUED | OUTPATIENT
Start: 2019-03-27 | End: 2019-03-28 | Stop reason: HOSPADM

## 2019-03-27 RX ORDER — OXYTOCIN/0.9 % SODIUM CHLORIDE 30/500 ML
100 PLASTIC BAG, INJECTION (ML) INTRAVENOUS CONTINUOUS
Status: DISCONTINUED | OUTPATIENT
Start: 2019-03-27 | End: 2019-03-28 | Stop reason: HOSPADM

## 2019-03-27 RX ORDER — LIDOCAINE HYDROCHLORIDE AND EPINEPHRINE 15; 5 MG/ML; UG/ML
INJECTION, SOLUTION EPIDURAL PRN
Status: DISCONTINUED | OUTPATIENT
Start: 2019-03-27 | End: 2019-03-28 | Stop reason: HOSPADM

## 2019-03-27 RX ORDER — OXYCODONE AND ACETAMINOPHEN 5; 325 MG/1; MG/1
1 TABLET ORAL
Status: DISCONTINUED | OUTPATIENT
Start: 2019-03-27 | End: 2019-03-27

## 2019-03-27 RX ORDER — FENTANYL CITRATE 50 UG/ML
100 INJECTION, SOLUTION INTRAMUSCULAR; INTRAVENOUS ONCE
Status: COMPLETED | OUTPATIENT
Start: 2019-03-27 | End: 2019-03-27

## 2019-03-27 RX ORDER — FENTANYL CITRATE 50 UG/ML
50-100 INJECTION, SOLUTION INTRAMUSCULAR; INTRAVENOUS
Status: DISCONTINUED | OUTPATIENT
Start: 2019-03-27 | End: 2019-03-27

## 2019-03-27 RX ORDER — ACETAMINOPHEN 325 MG/1
650 TABLET ORAL EVERY 4 HOURS PRN
Status: DISCONTINUED | OUTPATIENT
Start: 2019-03-27 | End: 2019-03-27

## 2019-03-27 RX ORDER — CARBOPROST TROMETHAMINE 250 UG/ML
250 INJECTION, SOLUTION INTRAMUSCULAR
Status: DISCONTINUED | OUTPATIENT
Start: 2019-03-27 | End: 2019-03-27

## 2019-03-27 RX ORDER — HYDROCORTISONE 2.5 %
CREAM (GRAM) TOPICAL 3 TIMES DAILY PRN
Status: DISCONTINUED | OUTPATIENT
Start: 2019-03-27 | End: 2019-03-28 | Stop reason: HOSPADM

## 2019-03-27 RX ORDER — SODIUM CHLORIDE, SODIUM LACTATE, POTASSIUM CHLORIDE, CALCIUM CHLORIDE 600; 310; 30; 20 MG/100ML; MG/100ML; MG/100ML; MG/100ML
INJECTION, SOLUTION INTRAVENOUS CONTINUOUS
Status: DISCONTINUED | OUTPATIENT
Start: 2019-03-27 | End: 2019-03-27

## 2019-03-27 RX ORDER — OXYTOCIN/0.9 % SODIUM CHLORIDE 30/500 ML
1-24 PLASTIC BAG, INJECTION (ML) INTRAVENOUS CONTINUOUS
Status: DISCONTINUED | OUTPATIENT
Start: 2019-03-27 | End: 2019-03-27

## 2019-03-27 RX ADMIN — FENTANYL CITRATE 100 MCG: 50 INJECTION, SOLUTION INTRAMUSCULAR; INTRAVENOUS at 08:22

## 2019-03-27 RX ADMIN — IBUPROFEN 800 MG: 400 TABLET ORAL at 13:35

## 2019-03-27 RX ADMIN — ACETAMINOPHEN 650 MG: 325 TABLET, FILM COATED ORAL at 13:35

## 2019-03-27 RX ADMIN — OXYTOCIN-SODIUM CHLORIDE 0.9% IV SOLN 30 UNIT/500ML 2 MILLI-UNITS/MIN: 30-0.9/5 SOLUTION at 07:35

## 2019-03-27 RX ADMIN — LIDOCAINE HYDROCHLORIDE,EPINEPHRINE BITARTRATE 2 ML: 15; .005 INJECTION, SOLUTION EPIDURAL; INFILTRATION; INTRACAUDAL; PERINEURAL at 08:21

## 2019-03-27 RX ADMIN — Medication 5 MG: at 09:14

## 2019-03-27 RX ADMIN — SODIUM CHLORIDE 2.5 MILLION UNITS: 9 INJECTION, SOLUTION INTRAVENOUS at 11:19

## 2019-03-27 RX ADMIN — Medication 12 ML/HR: at 08:30

## 2019-03-27 RX ADMIN — PENICILLIN G POTASSIUM 5 MILLION UNITS: 5000000 POWDER, FOR SOLUTION INTRAMUSCULAR; INTRAPLEURAL; INTRATHECAL; INTRAVENOUS at 06:23

## 2019-03-27 RX ADMIN — PRENATAL VIT W/ FE FUMARATE-FA TAB 27-0.8 MG 1 TABLET: 27-0.8 TAB at 22:14

## 2019-03-27 RX ADMIN — ACETAMINOPHEN 650 MG: 325 TABLET, FILM COATED ORAL at 19:37

## 2019-03-27 RX ADMIN — IBUPROFEN 800 MG: 400 TABLET ORAL at 19:37

## 2019-03-27 RX ADMIN — SODIUM CHLORIDE, POTASSIUM CHLORIDE, SODIUM LACTATE AND CALCIUM CHLORIDE: 600; 310; 30; 20 INJECTION, SOLUTION INTRAVENOUS at 09:24

## 2019-03-27 RX ADMIN — SODIUM CHLORIDE, POTASSIUM CHLORIDE, SODIUM LACTATE AND CALCIUM CHLORIDE: 600; 310; 30; 20 INJECTION, SOLUTION INTRAVENOUS at 08:00

## 2019-03-27 RX ADMIN — ROPIVACAINE HYDROCHLORIDE 5 ML: 2 INJECTION, SOLUTION EPIDURAL; INFILTRATION at 08:24

## 2019-03-27 RX ADMIN — ROPIVACAINE HYDROCHLORIDE 5 ML: 2 INJECTION, SOLUTION EPIDURAL; INFILTRATION at 08:21

## 2019-03-27 RX ADMIN — SENNOSIDES AND DOCUSATE SODIUM 1 TABLET: 8.6; 5 TABLET ORAL at 19:37

## 2019-03-27 RX ADMIN — LIDOCAINE HYDROCHLORIDE,EPINEPHRINE BITARTRATE 3 ML: 15; .005 INJECTION, SOLUTION EPIDURAL; INFILTRATION; INTRACAUDAL; PERINEURAL at 08:20

## 2019-03-27 RX ADMIN — SODIUM CHLORIDE, POTASSIUM CHLORIDE, SODIUM LACTATE AND CALCIUM CHLORIDE: 600; 310; 30; 20 INJECTION, SOLUTION INTRAVENOUS at 06:23

## 2019-03-27 RX ADMIN — Medication 5 MG: at 09:27

## 2019-03-27 ASSESSMENT — MIFFLIN-ST. JEOR: SCORE: 1804.92

## 2019-03-27 NOTE — ANESTHESIA PROCEDURE NOTES
Peripheral nerve/Neuraxial procedure note : epidural catheter  Pre-Procedure  Performed by Willian Rubio DO  Location: OB      Pre-Anesthestic Checklist: patient identified, IV checked, site marked, risks and benefits discussed, informed consent, monitors and equipment checked, pre-op evaluation and at physician/surgeon's request    Timeout  Correct Patient: Yes   Correct Procedure: Yes   Correct Site: Yes   Correct Laterality: Yes   Correct Position: Yes   Site Marked: Yes   .   Procedure Documentation    .    Procedure:    Epidural catheter.  Insertion Site:L2-3  (midline approach) Injection technique: LORT saline and LORT air   Local skin infiltrated with 1 mL of 1% lidocaine.       Patient Prep;povidone-iodine 7.5% surgical scrub.  .  Needle: Touhy needle Needle Gauge: 17.    Needle Length (Inches) 3.5  # of attempts: 1 and # of redirects:  .   Catheter: 19 G . .  Catheter threaded easily  .  .   .    Assessment/Narrative  Paresthesias: No.  .  .  Aspiration negative for heme or CSF  . Test dose of 3 mL lidocaine 1.5% w/ 1:200,000 epinephrine at. Test dose negative for signs of intravascular, subdural or intrathecal injection. Comments:  Pre-procedure time out completed. Patient in sitting position, the lumbar spine was prepped and draped in sterile fashion. The L2/L3 interspace was identified and local anesthetic was injected for local skin infiltration. A 17 G touhy needle was advanced to the epidural space which was confirmed with the loss of resistance technique at 6 cm. A catheter was then advanced easily into the epidural space. The catheter was left at 11 cm at the skin. Negative aspiration of blood and CSF was confirmed. A test dose of 1.5% lidocaine with 1:200,000 epinephrine was injected through the catheter and was negative for intravascular injection. The site was covered with sterile tegaderm and the catheter was secured with tape.

## 2019-03-27 NOTE — PLAN OF CARE
Data: Marivel Jones transferred to [facility] via wheelchair at 1410. Baby transferred via parent's arms.  Action: Receiving unit notified of transfer: Yes. Patient and family notified of room change. Report given to SUSANA Lino RN at 1410. Belongings sent to receiving unit. Accompanied by Registered Nurse. Oriented patient to surroundings. Call light within reach. ID bands double-checked with receiving RN.  Response: Patient tolerated transfer and is stable.

## 2019-03-27 NOTE — PLAN OF CARE
Pt on monitors, resting comfortably, baby looks well, mother plans to get epidural when pain increases

## 2019-03-27 NOTE — L&D DELIVERY NOTE
of viable female at 1138   Baby's weight 6-9#.     Apgars unavailable    Clear AF  1st degree lac repaired with 3-0 vicryl    Primary OB: Huber

## 2019-03-27 NOTE — ANESTHESIA PREPROCEDURE EVALUATION
Anesthesia Pre-Procedure Evaluation    Patient: Marivel Jones   MRN: [id number] : 1985          Preoperative Diagnosis: * No surgery found *        Past Medical History:   Diagnosis Date     Abnormal Pap smear     colposcopy , 9 o'clock bx from colpo was benign     Condyloma acuminatum     TCA'd     Depressive disorder     Postpartum Depression after 2nd baby     History of tetanus, diphtheria, and acellular pertussis booster vaccination (Tdap) 12     History of vaccination against human papillomavirus     completed     Hypothyroidism     Dr. Aiken. followed summer 2012 and levels normal off meds so stopped them.     IUD contraception 2014    Mirena placed by Huber. removed 4/3/17 for pregnancy     Overweight      Postpartum depression     10 days postpartum onset     Vitamin D deficiency 2015     Past Surgical History:   Procedure Laterality Date     wisdom teeth  2012       Anesthesia Evaluation       history and physical reviewed .             ROS/MED HX    ENT/Pulmonary:  - neg pulmonary ROS     Neurologic:  - neg neurologic ROS     Cardiovascular:  - neg cardiovascular ROS       METS/Exercise Tolerance:     Hematologic:         Musculoskeletal:         GI/Hepatic:  - neg GI/hepatic ROS       Renal/Genitourinary:         Endo:         Psychiatric:         Infectious Disease:         Malignancy:         Other:                     neg OB ROS            Physical Exam  Normal systems: cardiovascular and pulmonary    Airway   Mallampati: II  TM distance: > 3 FB  Neck ROM: full  Mouth opening: > 3 cm    Dental     Cardiovascular       Pulmonary         No Known Allergies  Social History     Tobacco Use     Smoking status: Never Smoker     Smokeless tobacco: Never Used   Substance Use Topics     Alcohol use: Yes     Alcohol/week: 0.0 oz     Comment: Not while pregnant     Prior to Admission medications    Medication Sig Start Date End Date Taking? Authorizing  "Provider   omeprazole (PRILOSEC OTC) 20 MG EC tablet Take 1 tablet (20 mg) by mouth daily 2/13/19  Yes Karen Ngo MD   ondansetron (ZOFRAN) 8 MG tablet Take 1 tablet (8 mg) by mouth every 8 hours as needed for nausea 12/7/18  Yes Karen Ngo MD   Prenatal Multivit-Min-Fe-FA (PRENATAL VITAMINS PO) Take 1 tablet by mouth   Yes Reported, Patient     Current Facility-Administered Medications Ordered in Epic   Medication Dose Route Frequency Last Rate Last Dose     acetaminophen (TYLENOL) tablet 650 mg  650 mg Oral Q4H PRN         carboprost (HEMABATE) injection 250 mcg  250 mcg Intramuscular Once PRN         ePHEDrine injection 5 mg  5 mg Intravenous Q3 Min PRN         fentaNYL (PF) (SUBLIMAZE) injection  mcg   mcg Intravenous Q1H PRN         fentaNYL (SUBLIMAZE) 2 mcg/mL, ropivacaine (NAROPIN) 0.2% in NaCl 0.9% EPIDURAL infusion   EPIDURAL Continuous         ibuprofen (ADVIL/MOTRIN) tablet 800 mg  800 mg Oral Once PRN         lactated ringers BOLUS 1,000 mL  1,000 mL Intravenous Once PRN   1,000 mL at 03/27/19 0725    Or     lactated ringers BOLUS 500 mL  500 mL Intravenous Once PRN         lactated ringers BOLUS 250 mL  250 mL Intravenous Once PRN         lactated ringers BOLUS 500 mL  500 mL Intravenous Once PRN         lactated ringers infusion   Intravenous Continuous 125 mL/hr at 03/27/19 0623       lidocaine (LMX4) cream   Topical Q1H PRN         lidocaine 1 % 0.1-1 mL  0.1-1 mL Other Q1H PRN         lidocaine 1 % 0.1-20 mL  0.1-20 mL Subcutaneous Once PRN         medication instruction   Does not apply Continuous PRN         medication instruction   Does not apply Continuous PRN         Medication Instructions: misoprostol (CYTOTEC)- Nurse to discuss ordering with provider, if needed. Ordered via \"OB misoprostol (CYTOTEC) Postpartum Hemorrhage PANEL\"   Does not apply Continuous PRN         methylergonovine (METHERGINE) injection 200 mcg  200 mcg Intramuscular Once PRN         nalbuphine " (NUBAIN) injection 2.5-5 mg  2.5-5 mg Intravenous Q6H PRN         naloxone (NARCAN) injection 0.1-0.4 mg  0.1-0.4 mg Intravenous Q2 Min PRN         naloxone (NARCAN) injection 0.1-0.4 mg  0.1-0.4 mg Intravenous Q2 Min PRN         nitrous oxide/oxygen 50/50 blend   Inhalation Continuous PRN         NO Rho (D) immune globulin (RhoGam) needed - mother Rh NEGATIVE - NOT Clinically indicated at this time   Does not apply Continuous PRN         ondansetron (ZOFRAN) injection 4 mg  4 mg Intravenous Q6H PRN         ondansetron (ZOFRAN) injection 4 mg  4 mg Intravenous Q6H PRN         Opioid plan postpartum - medication instruction   Does not apply Continuous PRN         oxyCODONE-acetaminophen (PERCOCET) 5-325 MG per tablet 1 tablet  1 tablet Oral Once PRN         oxytocin (PITOCIN) 30 units in 500 mL 0.9% NaCl infusion  100-340 mL/hr Intravenous Continuous PRN         oxytocin (PITOCIN) 30 units in 500 mL 0.9% NaCl infusion  1-24 nacho-units/min Intravenous Continuous 2 mL/hr at 03/27/19 0735 2 nacho-units/min at 03/27/19 0735     oxytocin (PITOCIN) injection 10 Units  10 Units Intramuscular Once PRN         penicillin G potassium injection 2.5 Million Units  2.5 Million Units Intravenous Q4H         ROPivacaine (NAROPIN) injection 10 mL  10 mL EPIDURAL Once         sodium chloride (PF) 0.9% PF flush 3 mL  3 mL Intracatheter q1 min prn         sodium chloride (PF) 0.9% PF flush 3 mL  3 mL Intracatheter Q8H         sodium chloride (PF) 0.9% PF flush 3 mL  3 mL Intracatheter q1 min prn         sodium chloride (PF) 0.9% PF flush 3 mL  3 mL Intracatheter Q8H         tranexamic acid (CYKLOKAPRON) Bolus 1g vial attach to NaCl 50 or 100 mL bag ADULT  1 g Intravenous Q30 Min PRN         No current Epic-ordered outpatient medications on file.       fentaNYL-ropivacaine       lactated ringers 125 mL/hr at 03/27/19 0623     - MEDICATION INSTRUCTIONS -       - MEDICATION INSTRUCTIONS -       - MEDICATION INSTRUCTIONS -       nitrous  oxide/oxygen 50/50 blend       NO Rho (D) immune globulin (RhoGam) needed - mother Rh NEGATIVE - NOT Clinically indicated at this time       - MEDICATION INSTRUCTIONS -       oxytocin in 0.9% NaCl       oxytocin in 0.9% NaCl 2 nacho-units/min (03/27/19 0735)     Recent Labs   Lab Test 06/19/15  0718 06/09/15  1340    138   POTASSIUM 4.1 3.8   CHLORIDE 106 103   CO2 25 27   ANIONGAP 9 8   GLC 87 89   BUN 16 14   CR 0.80 0.81   ANTHONY 9.0 9.2     Recent Labs   Lab Test 03/15/19  0943 02/01/19  1438 09/28/18  1438   WBC 11.7*  --  10.7   HGB 12.4 12.1 14.2     --  296     Recent Labs   Lab Test 03/27/19  0536   ABO B   RH Pos     No results for input(s): TROPI in the last 35900 hours.  No results for input(s): PH, PCO2, PO2, HCO3 in the last 06774 hours.  No results for input(s): HCG in the last 54952 hours.  Recent Results (from the past 744 hour(s))   US OB >14 Weeks Follow Up    Narrative    US OB >14 Weeks Follow Up   Order #: [id number] Accession #: [id number]   Study Notes        Terese Vanegas on 3/15/2019  9:01 AM   Obstetrical Ultrasound Report  OB U/S - 2nd/3rd Trimester - Transabdominal    [facility]  Referring physician: Dr. Karen Ngo  Sonographer: Terese Vanegas  Indication:  F/U Growth     Dating (mm/dd/yyyy):   LMP: Patient's last menstrual period was 07/15/2018.               EDC:    Estimated Date of Delivery: Apr 17, 2019   GA by LMP:   35w2d  Current Scan On (mm/dd/yyyy):  3/15/2019                       EDC:   04/18/19             GA by Current   Scan:      35w1d  The calculation of the gestational age by current scan was based on BPD,   HC, AC and FL.     Anatomy Scan:  [place] gestation.  Biometry:  BPD 8.57 cm 34w4d 32.4%   HC 31.09 cm 34w5d 10%   AC 32.35 cm 36w2d 82.8%   FL 6.82 cm 35w0d 36.5%   EFW (lbs/oz) 6 lbs               0ozs       EFW (g) 2719 g 52%        Fetal heart rate: 138bpm  Fetal presentation: Cephalic  Amniotic fluid: 16.45cm  Placenta:  "posterior   Impression:                  Growth is appropriate for gestational age.  EFW by today's ultrasound is 2719grams, which is the 52%tile.  Normal GOKUL, vertex presentation.    Karen Ngo         RECENT LABS:           Lab Results   Component Value Date    WBC 11.7 (H) 03/15/2019    HGB 12.4 03/15/2019    HCT 36.1 03/15/2019     03/15/2019     06/19/2015    POTASSIUM 4.1 06/19/2015    CHLORIDE 106 06/19/2015    CO2 25 06/19/2015    BUN 16 06/19/2015    CR 0.80 06/19/2015    GLC 87 06/19/2015    ANTHONY 9.0 06/19/2015    ALBUMIN 4.2 06/19/2015    PROTTOTAL 7.8 06/19/2015    ALT 22 06/19/2015    AST 8 06/19/2015    ALKPHOS 77 06/19/2015    BILITOTAL 0.8 06/19/2015    TSH 1.54 02/01/2019    T4 0.99 09/28/2018       Preop Vitals  BP Readings from Last 3 Encounters:   03/27/19 129/74   03/15/19 114/76   03/01/19 116/64    Pulse Readings from Last 3 Encounters:   09/28/18 77   04/03/17 84   01/04/17 70      Resp Readings from Last 3 Encounters:   03/27/19 16   03/04/16 14   07/23/14 16    SpO2 Readings from Last 3 Encounters:   03/27/19 98%   06/16/15 98%   06/09/15 99%      Temp Readings from Last 1 Encounters:   03/27/19 36.8  C (98.2  F) (Temporal)    Ht Readings from Last 1 Encounters:   03/27/19 1.676 m (5' 6\")      Wt Readings from Last 1 Encounters:   03/27/19 109.3 kg (241 lb)    Estimated body mass index is 38.9 kg/m  as calculated from the following:    Height as of this encounter: 1.676 m (5' 6\").    Weight as of this encounter: 109.3 kg (241 lb).       Anesthesia Plan      History & Physical Review      ASA Status:  2 .  OB Epidural Asa: 2            Postoperative Care      Consents  Anesthetic plan, risks, benefits and alternatives discussed with:  Patient..                 Navneet Espinoza MD  "

## 2019-03-27 NOTE — L&D DELIVERY NOTE
Delivery Date:  2019      HISTORY OF PRESENT ILLNESS:  Marivel is a 33-year-old,  3, para 2-0-0-2, whose pregnancy was followed by myself.  The patient is blood type B positive.  She is rubella immune and she was group B strep unknown.  The patient had a history of subclinical hypothyroidism, but was not on any medications and her TSH was normal throughout pregnancy.  Because of advanced maternal age the patient did have NIPT testing done that was normal as well as a normal AFP and had a normal anatomy ultrasound.  The patient received both flu shot and DTaP shot during her pregnancy.  She passed her 1-hour GCT at 133.  She had had a vacuum-assisted delivery with her first son who was 8 pounds 7 ounces and then a normal vaginal delivery of her second son who was 8 pounds 15 ounces.  With this pregnancy she had no complications and was actually scheduled for her 37-week appointment this afternoon, but presented at 1:30 in the morning with premature rupture of membranes.  The patient did have ROM plus testing actually on 2 different occasions and that was negative both times.  However, by the nurse's report, the patient was grossly ruptured and was having large amounts of amniotic fluid noted with every cervix check.  Because of this, the patient was admitted.      On admission, the patient was found to be 3 cm dilated.  She remained 3 cm dilated with very rare contractions all through the night and then at approximately 7:00 a.m. was started on Pitocin.  She received an epidural for pain control and got to a maximum of 5 milliunits per minute of Pitocin and then kicked right into labor.  Her active labor onset time was 7:00 a.m.  She was found to be complete at 11:20.  She then began pushing upon my arrival at 11:37 and with 1 push delivered a viable female infant via the right occiput anterior position at 11:38.  Apgars are unavailable at the time of this dictation, but the baby was doing great.  Her  weight was 6 pounds 9 ounces.      The placenta then delivered intact with a normal 3-vessel cord at 11:41.  The umbilical cord clamping was delayed for 1 minute prior to that.  The patient sustained a small first-degree perineal laceration that was repaired in the normal fashion with 3-0 Vicryl suture.  She also had a periurethral abrasion, but this was not repaired and was hemostatic.  Quantitative blood loss is pending at the time of this dictation, but estimated blood loss was only about 100 mL.      Both mother and infant were doing extremely well immediately after delivery and there were no complications.  All sponge, lap and instrument counts were correct x2.      First stage of labor 4 hours and 20 minutes, second stage 18 minutes, third stage 3 minutes for a total of 4 hours and 41 minutes.      DIAGNOSES:   1.  Intrauterine pregnancy at 37+0 with premature rupture of membranes.   2.  Pitocin induction of labor.   3.  Epidural for pain control.   4.  Normal spontaneous vaginal delivery of a viable female infant.   5.  Repair of first-degree perineal laceration.         MICHELE COPELAND MD             D: 2019   T: 2019   MT: PAWEL      Name:     JOHNY HARTLEY   MRN:      [id number]        Account:        [id number]   :      1985        Delivery Date:  2019               Document: [id number]

## 2019-03-27 NOTE — PROVIDER NOTIFICATION
03/27/19 0515   Provider Notification   Provider Name/Title Dr. Dietrich   Method of Notification Phone   Request Evaluate - Remote   Notification Reason SVE;Status Update;Lab/Diagnostic Study;Uterine Activity;Labor Status   Orders received to admit to labor and delivery.

## 2019-03-27 NOTE — PLAN OF CARE
Spontaneous vaginal delivery of viable female infant; spontaneous vaginal delivery of placenta.  FF U/1, light rubra noted.  First degree laceration repaired; ice applied to perineum.  Pt delivered comfortably with labor epidural.   present and supportive.  Pt breastfeeding infant at this time.

## 2019-03-27 NOTE — PLAN OF CARE
Patient transferred to room 410 at 1445. Patient oriented to postpartum. Infant safety reviewed, patient states understanding. Patient denies pain at this time. Patient ambulating to bathroom independently, voiding without difficulty. IV discontinued. Encouraged patient to call with needs/questions. Call light within reach, will continue to monitor.    Statement Selected

## 2019-03-27 NOTE — PLAN OF CARE
Data: Patient admitted to room 232 at 0550. Patient is a . Prenatal record reviewed. Gestational age 37w0d. Vital signs per doc flowsheet. Fetal movement present. Patient reports Rule Out Labor and Rule out rupture of membranes   as reason for admission. Support person Yoel present.  Action: Report from KAYLIN Wilson obtained at 0550. Verbal consent for EFM, external fetal monitors applied. Admission assessment completed. Patient and support persons educated on labor process. Patient instructed to report change in fetal movement, contractions, vaginal leaking of fluid or bleeding, abdominal pain, or any concerns related to the pregnancy to her nurse/physician. Patient oriented to room, call light in reach.   Response: Dr. Dietrich informed of admission. Plan to call primary (Huber) at 0700 and discuss plan of care.  Patient verbalized understanding of education and verbalized agreement with plan. Patient coping with labor.    0550: pt admitted into room 232     19 0711   Provider Notification   Provider Name/Title Dr Ngo    Method of Notification Phone   Request Evaluate - Remote   Notification Reason Status Update;Labor Status, discussed plan of care, start pit, ok for pt to have epidural

## 2019-03-27 NOTE — PLAN OF CARE
"0256:  presents at 37 0/7 weeks gestation to the MAC stating \"My water broke at 0130 and I have been having contractions ever since.\" External monitors applied after verbal consent by patient. Admission database obtained and prenatal record reviewed. Vital signs obtained and WNL. Patient and spouse oriented to room, call light and plan of care while in the MAC. Guardianship discussed, verified in Epic, form signed and placed in chart.  0320: GBS collected, ROM+ collected, SVE 3/70/-2 with large gush clear fluid after SVE.  0400: ROM+ (negative) Dr. Dietrich updated via telephone regarding but not limited to patient arrival, FHT, UTC's, SVE with gush of fluid after, ROM+(negative) prenatal history. Dr. Dietrich ordered a repeat ROM+ and ordered for GBS to be sent to lab.  0510: SVE 3/70/-2 with large gush of fluid after SVE. ROM+ (negative). 0515: Dr. Dietrich updated via telephone regarding ROM+ (negative), SVE unchanged but gush of fluid with SVE. Orders received to admit to labor and delivery, start IVAB for unknown GBS status and to page Dr. Ngo at 0700 to update her.  0538: External monitors removed. Patient and spouse along with all belongings escorted to room 232. SBAR report to Dean RENTERIA RN to assume all cares for this patient.    "

## 2019-03-27 NOTE — PROVIDER NOTIFICATION
03/27/19 0400   Provider Notification   Provider Name/Title Dr. Dietrich   Method of Notification Phone   Request Evaluate - Remote   Notification Reason Patient Arrived;Membrane Status;Labor Status;Uterine Activity;Status Update;SVE;Lab/Diagnostic Study   Will repeat ROM+ as the first one was done prior to SVE and gush of fluid.

## 2019-03-27 NOTE — H&P
No significant change in general health status based on examination of the patient, review of Nursing Admission Database and prenatal record. PROM at 0130. cvx 3cm throughout the night with rare ctx. Will start pitocin induction of labor. Epidural whenever requested.    EFW: 7lb

## 2019-03-27 NOTE — TELEPHONE ENCOUNTER
"Reason for Call/Nurse Assessment:  34 y/o female reports rupture of membrane, laying in bed, felt kick and gush of water, no contractions, no vag bleeding, baby movements wnls, OB is Dr. Ngo, plans to deliver at [facility]. Shehas someone there to take her in for evaluation,     RN Action/Disposiiton:  RN called and spoke with Shiela - RN  At [facility] 1:57AM to give report and let them know she is on the way in to them.    Aziza Love RN - [place] Nurse Advisor  03/27/2019   2:02 AM    Reason for Disposition    [1] Leakage of fluid from vagina AND [2] leakage started < 4 hours ago    Additional Information    Negative: Passed out (i.e., lost consciousness, collapsed and was not responding)    Negative: Shock suspected (e.g., cold/pale/clammy skin, too weak to stand, low BP, rapid pulse)    Negative: Difficult to awaken or acting confused  (e.g., disoriented, slurred speech)    Negative: [1] SEVERE abdominal pain (e.g., excruciating) AND [2] constant AND [3] present > 1 hour    Negative: Severe bleeding (e.g., continuous red blood from vagina, or large blood clots)    Negative: Umbilical cord hanging out of the vagina (shiny, white, curled appearance, \"like telephone cord\")    Negative: Uncontrollable urge to push (i.e., feels like baby is coming out now)    Negative: Can see baby    Negative: Sounds like a life-threatening emergency to the triager    Negative: [1] First baby (primipara) AND [2] contractions < 6 minutes apart  AND [3] present 2 hours    Negative: [1] History of prior delivery (multipara) AND [2] contractions < 10 minutes apart AND [3] present 1 hour    Negative: [1] History of rapid prior delivery AND [2] contractions < 10 minutes apart    Negative: [1] Leakage of fluid from vagina AND [2] green or brown in color    Negative: [1] Leakage of fluid from vagina AND [2] leakage started > 4 hours ago    Negative: Vaginal bleeding or spotting    Negative: Baby moving less today (e.g., kick " count < 5 in 1 hour or < 10 in 2 hours)    Negative: New hand or face swelling    Negative: MODERATE-SEVERE abdominal pain    Negative: Fever > 100.4 F (38.0 C)    Protocols used: PREGNANCY - LABOR-ADULT-AH

## 2019-03-28 VITALS
RESPIRATION RATE: 16 BRPM | OXYGEN SATURATION: 97 % | BODY MASS INDEX: 38.73 KG/M2 | DIASTOLIC BLOOD PRESSURE: 79 MMHG | HEART RATE: 92 BPM | WEIGHT: 241 LBS | HEIGHT: 66 IN | SYSTOLIC BLOOD PRESSURE: 123 MMHG | TEMPERATURE: 97.7 F

## 2019-03-28 LAB
GP B STREP DNA SPEC QL NAA+PROBE: NEGATIVE
SPECIMEN SOURCE: NORMAL

## 2019-03-28 PROCEDURE — 25000132 ZZH RX MED GY IP 250 OP 250 PS 637: Performed by: OBSTETRICS & GYNECOLOGY

## 2019-03-28 RX ADMIN — IBUPROFEN 800 MG: 400 TABLET ORAL at 01:23

## 2019-03-28 RX ADMIN — IBUPROFEN 800 MG: 400 TABLET ORAL at 07:40

## 2019-03-28 RX ADMIN — ACETAMINOPHEN 650 MG: 325 TABLET, FILM COATED ORAL at 01:24

## 2019-03-28 RX ADMIN — ACETAMINOPHEN 650 MG: 325 TABLET, FILM COATED ORAL at 14:13

## 2019-03-28 RX ADMIN — ACETAMINOPHEN 650 MG: 325 TABLET, FILM COATED ORAL at 07:40

## 2019-03-28 RX ADMIN — IBUPROFEN 800 MG: 400 TABLET ORAL at 14:13

## 2019-03-28 RX ADMIN — SENNOSIDES AND DOCUSATE SODIUM 1 TABLET: 8.6; 5 TABLET ORAL at 07:41

## 2019-03-28 NOTE — PROGRESS NOTES
[facility]    Post-Partum Progress Note    Assessment & Plan   Assessment:  Post-partum day #1  Normal spontaneous vaginal delivery    Doing well.  No excessive bleeding  Pain well-controlled.    Plan:  Ambulation encouraged  Hemoglobin in AM  Lactation consultation  Pain control measures as needed  Discharge later today    Rosa Dietrich     Interval History   Doing well.  Pain is well-controlled --cramping with feeding.  No fevers.  No history of foul-smelling vaginal discharge.  Good appetite.  Denies chest pain, shortness of breath, nausea or vomiting.  Vaginal bleeding is similar to a heavy menstrual flow.  Ambulatory.  Breastfeeding well.  No issues overnight    Medications     - MEDICATION INSTRUCTIONS -       NO Rho (D) immune globulin (RhoGam) needed - mother Rh POSITIVE       - MEDICATION INSTRUCTIONS -       oxytocin in 0.9% NaCl 100 mL/hr (03/27/19 1211)     oxytocin in 0.9% NaCl         prenatal multivitamin w/iron  1 tablet Oral At Bedtime     senna-docusate  1 tablet Oral BID    Or     senna-docusate  2 tablet Oral BID       Physical Exam   Temp: 97.7  F (36.5  C) Temp src: Oral BP: 112/75 Pulse: 92 Heart Rate: 79 Resp: 16 SpO2: 97 %      Vitals:    03/27/19 0308   Weight: 109.3 kg (241 lb)     Vital Signs with Ranges  Temp:  [97.7  F (36.5  C)-98.8  F (37.1  C)] 97.7  F (36.5  C)  Pulse:  [92] 92  Heart Rate:  [79-82] 79  Resp:  [16] 16  BP: ()/(38-86) 112/75  SpO2:  [93 %-98 %] 97 %  I/O last 3 completed shifts:  In: -   Out: 700 [Urine:700]    Uterine fundus is firm, non-tender and at the level of the umbilicus  Extremities Non-tender    Data   Recent Labs   Lab Test 03/27/19  0536   ABO B   RH Pos   AS Neg     Recent Labs   Lab Test 03/15/19  0943 02/01/19  1438   HGB 12.4 12.1     Recent Labs   Lab Test 09/28/18  1438   RUQIGG 31

## 2019-03-28 NOTE — ANESTHESIA POSTPROCEDURE EVALUATION
Patient: Marivel Jones    * No procedures listed *    Diagnosis:* No pre-op diagnosis entered *  Diagnosis Additional Information: No value filed.    Anesthesia Type:  No value filed.    Note:  Anesthesia Post Evaluation    Patient location during evaluation: Floor  Patient participation: Able to fully participate in evaluation  Level of consciousness: awake and alert  Pain management: adequate  Airway patency: patent  Cardiovascular status: acceptable and hemodynamically stable  Respiratory status: acceptable and unassisted  Hydration status: acceptable  PONV: none     Anesthetic complications: None          Last vitals:  Vitals:    03/27/19 1937 03/27/19 2321 03/28/19 0752   BP: 122/71 112/75 123/79   Pulse:      Resp: 16 16 16   Temp: 37.1  C (98.8  F) 36.5  C (97.7  F) 36.5  C (97.7  F)   SpO2:            Electronically Signed By: Navneet Espinoza MD  March 28, 2019  4:00 PM

## 2019-03-28 NOTE — PLAN OF CARE
Vital signs stable.  Fundus firm with scant flow.  Patient is up independently, voiding without difficulty, pain controlled with Tylenol and ibuprofen. Encouraged to continue to ambulate as able and void frequently. Patient is bonding well with infant  Working on breastfeeding every 2-3 hours.

## 2019-03-28 NOTE — LACTATION NOTE
This note was copied from a baby's chart.  Visited with family for routine lactation visit. Infant sleeping at time of visit. Marivel reports infant has been feeding well, but sleepy at times. Marivel states she had difficulty with breastfeeding both of her other children, but feels optimistic that this infant is doing much better. Frequent skin to skin, unlimited feedings and use of feeding log encouraged. Discussed option to pump in effort to establish better milk supply this time. Marivel receptive to education and will let us know if she would like to start pumping here. Marivel considering discharge today but has plan to f/u with [facility] tomorrow.

## 2019-03-28 NOTE — PLAN OF CARE
Pt discharging at this time via ambulatory to [address] with spouse. NA escorted out. Discharge instructions provided-no further questions at this time. Breast pump given.

## 2019-04-04 ENCOUNTER — DOCUMENTATION ONLY (OUTPATIENT)
Dept: OBGYN | Facility: CLINIC | Age: 34
End: 2019-04-04

## 2019-04-04 NOTE — PROGRESS NOTES
[facility] will be sharing updates with you on Maternal Child Health Referral requests for home care services.  This is for care coordination purposes and alert you to referral status.  We received the referral for  Marivel Jones; MRN [id number] and want to update you:    [facility] has made 2 attempts to contact patient by phone and text message over the last 4 days.   We have not had any response from patient.  Final message was left advising patient to follow up with Primary Care Providers for mom and baby.     Sincerely [facility]  Vish Puga  [phone]

## 2019-04-08 ENCOUNTER — HOSPITAL ENCOUNTER (OUTPATIENT)
Facility: CLINIC | Age: 34
End: 2019-04-08
Admitting: OBSTETRICS & GYNECOLOGY
Payer: COMMERCIAL

## 2019-05-03 ENCOUNTER — HEALTH MAINTENANCE LETTER (OUTPATIENT)
Age: 34
End: 2019-05-03

## 2019-05-07 NOTE — PROGRESS NOTES
"SUBJECTIVE:  Marivel Jones,  is here for a postpartum visit.  She had a  on 3/27/19 delivering a healthy baby girl, named Daxa weighing 6 lbs 9.3 oz at term.      HPI:  Patient is overall doing well. The baby is easy and goes with the flow. Giving her about 5 hours of sleep at a stretch. Just wants to be held a lot so if does that she's not fussy at all.  Lochia is done and healed really quickly. Is just a bit sore and has pressure when sits on the toilet but not terrible. Feels it at her perineum but generally as well  Would like to do an IUD for birth control  Tried to nurse and pump and lasted about 2 weeks. Just couldn't get enough supply and she was already small and lost some weight. Now is exclusively doing formula  Would like to restart on phentermine. Was on it prior to conceiving and lost a lot of weight with it and felt great. Now that not nursing would like to restart and get more serious about exercise and working out and get back to the 180's where she had been and felt the best    Last PHQ-9 score on record=   PHQ-9 SCORE 2019   PHQ-9 Total Score 0     NATACHA-7 SCORE 2018   Total Score 0 0 0       Delivery complications:  No  Breast feeding:  No  Bladder problems:  No  Bowel problems/hemorrhoids:  No  Episiotomy/laceration/incision healed? Yes: slight tenderness  Vaginal flow:  None  [place]:  No  Contraception: IUD  Emotional adjustment:  doing well and happy  Back to work:  19    12 point review of systems negative other than symptoms noted below.    OBJECTIVE:  Vitals: /76 (BP Location: Right arm, Patient Position: Sitting, Cuff Size: Adult Large)   Pulse 56   Ht 1.683 m (5' 6.25\")   Wt 104.8 kg (231 lb)   LMP 07/15/2018   Breastfeeding? No   BMI 37.00 kg/m    BMI= Body mass index is 37 kg/m .  General - pleasant female in no acute distress.  Breast -  deferred  Abdomen - No incision  Pelvic - EG: normal adult female, BUS: " within normal limits, Vagina: well rugated, no discharge, Cervix: no lesions or CMT, Uterus: firm, normal sized and nontender, anteverted in position. Adnexae: no masses or tenderness.  Rectovaginal - deferred.    ASSESSMENT:    ICD-10-CM    1. Routine postpartum follow-up Z39.2 Pap imaged thin layer screen with HPV - recommended age 30 - 65     HPV High Risk Types DNA Cervical   2. Obesity (BMI 35.0-39.9 without comorbidity) E66.9 phentermine (ADIPEX-P) 37.5 MG capsule       PLAN:  May resume normal activities without restrictions.  Pap smear was done today.    Full counseling was provided, and all questions were answered.   Return to clinic in one year for an annual visit.     Discussed different contraception methods and patient theodora return for a mirena    Readdressed the phentermine and the r/b/a. Discussed that the second time on it it doesn't tend to work quite as well so will have to really focus on not just decreased portions and cravings from the med but also healthy food choices and regular exercise.    Will restart on it and then f/u with me in 1 month on the med and for IUD insertion at same time.    Karen Ngo MD

## 2019-05-08 ENCOUNTER — PRENATAL OFFICE VISIT (OUTPATIENT)
Dept: OBGYN | Facility: CLINIC | Age: 34
End: 2019-05-08
Payer: COMMERCIAL

## 2019-05-08 VITALS
DIASTOLIC BLOOD PRESSURE: 76 MMHG | HEIGHT: 66 IN | WEIGHT: 231 LBS | SYSTOLIC BLOOD PRESSURE: 100 MMHG | BODY MASS INDEX: 37.12 KG/M2 | HEART RATE: 56 BPM

## 2019-05-08 DIAGNOSIS — E66.9 OBESITY (BMI 35.0-39.9 WITHOUT COMORBIDITY): ICD-10-CM

## 2019-05-08 PROBLEM — Z34.90 PREGNANCY: Status: RESOLVED | Noted: 2019-03-27 | Resolved: 2019-05-08

## 2019-05-08 PROBLEM — O09.529 SUPERVISION OF HIGH-RISK PREGNANCY OF ELDERLY MULTIGRAVIDA: Status: RESOLVED | Noted: 2018-09-28 | Resolved: 2019-05-08

## 2019-05-08 PROBLEM — Z36.89 ENCOUNTER FOR TRIAGE IN PREGNANT PATIENT: Status: RESOLVED | Noted: 2019-03-27 | Resolved: 2019-05-08

## 2019-05-08 PROCEDURE — 99213 OFFICE O/P EST LOW 20 MIN: CPT | Mod: 24 | Performed by: OBSTETRICS & GYNECOLOGY

## 2019-05-08 PROCEDURE — 87624 HPV HI-RISK TYP POOLED RSLT: CPT | Performed by: OBSTETRICS & GYNECOLOGY

## 2019-05-08 PROCEDURE — G0145 SCR C/V CYTO,THINLAYER,RESCR: HCPCS | Performed by: OBSTETRICS & GYNECOLOGY

## 2019-05-08 PROCEDURE — 99207 ZZC POST PARTUM EXAM: CPT | Performed by: OBSTETRICS & GYNECOLOGY

## 2019-05-08 RX ORDER — PHENTERMINE HYDROCHLORIDE 37.5 MG/1
37.5 CAPSULE ORAL EVERY MORNING
Qty: 30 CAPSULE | Refills: 3 | Status: SHIPPED | OUTPATIENT
Start: 2019-05-08 | End: 2019-08-21

## 2019-05-08 ASSESSMENT — ANXIETY QUESTIONNAIRES
5. BEING SO RESTLESS THAT IT IS HARD TO SIT STILL: NOT AT ALL
1. FEELING NERVOUS, ANXIOUS, OR ON EDGE: NOT AT ALL
IF YOU CHECKED OFF ANY PROBLEMS ON THIS QUESTIONNAIRE, HOW DIFFICULT HAVE THESE PROBLEMS MADE IT FOR YOU TO DO YOUR WORK, TAKE CARE OF THINGS AT HOME, OR GET ALONG WITH OTHER PEOPLE: NOT DIFFICULT AT ALL
GAD7 TOTAL SCORE: 0
7. FEELING AFRAID AS IF SOMETHING AWFUL MIGHT HAPPEN: NOT AT ALL
6. BECOMING EASILY ANNOYED OR IRRITABLE: NOT AT ALL
3. WORRYING TOO MUCH ABOUT DIFFERENT THINGS: NOT AT ALL
2. NOT BEING ABLE TO STOP OR CONTROL WORRYING: NOT AT ALL

## 2019-05-08 ASSESSMENT — PATIENT HEALTH QUESTIONNAIRE - PHQ9
5. POOR APPETITE OR OVEREATING: NOT AT ALL
SUM OF ALL RESPONSES TO PHQ QUESTIONS 1-9: 0

## 2019-05-08 ASSESSMENT — MIFFLIN-ST. JEOR: SCORE: 1763.53

## 2019-05-08 NOTE — LETTER
May 15, 2019    Marivel Jones  [address] [facility] [address]    Dear ,  This letter is regarding your recent Pap smear (cervical cancer screening) and Human Papillomavirus (HPV) test.  We are happy to inform you that your Pap smear result is normal. Cervical cancer is closely linked with certain types of HPV. Your results showed no evidence of high-risk HPV.  Therefore we recommend you return in 5 years for your next pap smear and HPV test.  You will still need to return to the clinic every year for an annual exam and other preventive tests.  If you have additional questions regarding this result, please call our registered nurse, Estefany at [phone].  Sincerely,    Karen Ngo MD/tio

## 2019-05-09 ASSESSMENT — ANXIETY QUESTIONNAIRES: GAD7 TOTAL SCORE: 0

## 2019-05-13 LAB
COPATH REPORT: NORMAL
PAP: NORMAL

## 2019-05-14 LAB
FINAL DIAGNOSIS: NORMAL
HPV HR 12 DNA CVX QL NAA+PROBE: NEGATIVE
HPV16 DNA SPEC QL NAA+PROBE: NEGATIVE
HPV18 DNA SPEC QL NAA+PROBE: NEGATIVE
SPECIMEN DESCRIPTION: NORMAL
SPECIMEN SOURCE CVX/VAG CYTO: NORMAL

## 2019-06-11 NOTE — PROGRESS NOTES
"  IUD Insertion:  CONSULT:    Is a pregnancy test required: Yes.  Was it positive or negative?  Negative  Was a consent obtained?  Yes    Subjective: Marivel Jones is a 33 year old  presents for IUD and desires Mirena type IUD.    Patient has been given the opportunity to ask questions about all forms of birth control, including all options appropriate for Marivel Jones. Discussed that no method of birth control, except abstinence is 100% effective against pregnancy or sexually transmitted infection.     Marivel Jones understands she may have the IUD removed at any time. IUD should be removed by a health care provider.    The entire insertion procedure was reviewed with the patient, including care after placement.    Patient's last menstrual period was 2019 (exact date). Last sexual activity: before delivery. No allergy to betadine or shellfish. Patient declines STD screening  HCG Qual Urine   Date Value Ref Range Status   2019 Negative NEG^Negative Final     Comment:     This test is for screening purposes.  Results should be interpreted along with   the clinical picture.  Confirmation testing is available if warranted by   ordering XLX993, HCG Quantitative Pregnancy.           /70   Ht 1.683 m (5' 6.25\")   Wt 102.1 kg (225 lb)   LMP 2019 (Exact Date)   Breastfeeding? No   BMI 36.04 kg/m      Pelvic Exam:   EG/BUS: normal genital architecture without lesions, erythema or abnormal secretions.   Vagina: moist, pink, rugae with physiologic discharge and secretions  Cervix: multiparous no lesions and pink, moist, closed, without lesion or CMT  Uterus: anteverted position, mobile, no pain  Adnexa: within normal limits and no masses, nodularity, tenderness    PROCEDURE NOTE: -- IUD Insertion    Reason for Insertion: contraception      Under sterile technique, cervix was visualized with speculum and prepped with Betadine solution swab x 3. Tenaculum " was placed for stability. The uterus was gently straightened and sounded to 8.0 cm. IUD prepared for placement, and IUD inserted according to 's instructions without difficulty or significant resitance, and deployed at the fundus. The strings were visualized and trimmed to 3.0 cm from the external os. Tenaculum was removed and hemostasis noted. Speculum removed.  Patient tolerated procedure well.    Lot # [id number]  Exp: 11/2021  NDC: 33928-139-77    EBL: minimal    Complications: none    ASSESSMENT:     ICD-10-CM    1. Encounter for insertion of intrauterine contraceptive device Z30.430 levonorgestrel (MIRENA) 20 MCG/24HR IUD 20 mcg     INSERTION INTRAUTERINE DEVICE   2. Obesity, Class II, BMI 35-39.9, isolated E66.9    3. Pre-procedure lab exam Z01.812 HCG Qual, Urine ([id number])   4. IUD contraception Z97.5         PLAN:    Given 's handouts, including when to have IUD removed, list of danger s/sx, side effects and follow up recommended. Encouraged condom use for prevention of STD. Back up contraception advised for 7 days if progestin method. Advised to call for any fever, for prolonged or severe pain or bleeding, abnormal vaginal discharge, or unable to palpate strings. She was advised to use pain medications (ibuprofen) as needed for mild to moderate pain. Advised to follow-up in clinic in 4-6 weeks for IUD string check if unable to find strings or as directed by provider.     Karen Ngo MD        SUBJECTIVE:                                                   Marivel Jones is a 33 year old female who presents to clinic today for the following health issue(s):  Patient presents with:  IUD: Insertion  Medication Follow Up: Phentermine      HPI:  Patient is here for her IUD insertion but also as a f/u to her phentermine. Patient was on it before deciding to conceive her daughter and then at her PP visit wanted to get back on it. Agrees that it's definitely not nearly as appetite  "suppressive as it was the first time she took it but is helping chika. Also is still up a lot with the baby and not able to exercise that much so knows some of it is that she needs to make some different and better choices and it would help.  However she is 6# down in just over a month and is fairly happy with that too. No side effects, no heart racing or nausea or headaches.    Patient's last menstrual period was 2019 (exact date)..   Patient is sexually active, .  Using IUD for contraception.    reports that she has never smoked. She has never used smokeless tobacco.    STD testing offered?  Declined    Health maintenance updated:  yes    Today's PHQ-2 Score: No flowsheet data found.  Today's PHQ-9 Score:   PHQ-9 SCORE 2019   PHQ-9 Total Score 0     Today's NATACHA-7 Score:   NATACHA-7 SCORE 2019   Total Score 0       Problem list and histories reviewed & adjusted, as indicated.  Additional history: as documented.    Patient Active Problem List   Diagnosis     Vitamin D deficiency     Subclinical hypothyroidism     IUD contraception     Past Surgical History:   Procedure Laterality Date     wisdom teeth        Social History     Tobacco Use     Smoking status: Never Smoker     Smokeless tobacco: Never Used   Substance Use Topics     Alcohol use: Not Currently     Alcohol/week: 0.0 oz     Comment: Not while pregnant      Problem (# of Occurrences) Relation (Name,Age of Onset)    Other Cancer (1) Maternal Grandmother    Thyroid Disease (1) Maternal Grandmother            Current Outpatient Medications   Medication Sig     phentermine (ADIPEX-P) 37.5 MG capsule Take 1 capsule (37.5 mg) by mouth every morning     No current facility-administered medications for this visit.      No Known Allergies    ROS:  12 point review of systems negative other than symptoms noted below.    OBJECTIVE:     /70   Ht 1.683 m (5' 6.25\")   Wt 102.1 kg (225 lb)   LMP 2019 (Exact Date)   Breastfeeding? No   " BMI 36.04 kg/m    Body mass index is 36.04 kg/m .    Exam:  Constitutional:  Appearance: Well nourished, well developed alert, in no acute distress   CV:RRR, no murmurs  Lungs:CTA bilaterally  Ext:NT, NE  Abd:soft, NT, ND, diastasis  In-Clinic Test Results:  No results found for this or any previous visit (from the past 24 hour(s)).    ASSESSMENT/PLAN:                                                        ICD-10-CM    1. Encounter for insertion of intrauterine contraceptive device Z30.430 levonorgestrel (MIRENA) 20 MCG/24HR IUD 20 mcg     INSERTION INTRAUTERINE DEVICE   2. Obesity, Class II, BMI 35-39.9, isolated E66.9    3. Pre-procedure lab exam Z01.812 HCG Qual, Urine ([id number])   4. IUD contraception Z97.5          Patient reassured that normal to feel like phentermine isn't as effective as it used to be but it is still beneficial. She is also sleep deprived which increases cortisol secretion which can aso cause some weight gain  Encouraged to avoid snacking, high carb/sugar foods and to start to at least do a fast paced walk with the stroller 4x/week for 30 min to offset some of her metabolic processing.  Will return in 1-2 months for another f/u  IUD placed w/o difficulty as per note above.    Karen Ngo MD  [facility] [place]

## 2019-06-14 ENCOUNTER — OFFICE VISIT (OUTPATIENT)
Dept: OBGYN | Facility: CLINIC | Age: 34
End: 2019-06-14
Payer: COMMERCIAL

## 2019-06-14 VITALS
SYSTOLIC BLOOD PRESSURE: 136 MMHG | HEIGHT: 66 IN | DIASTOLIC BLOOD PRESSURE: 70 MMHG | WEIGHT: 225 LBS | BODY MASS INDEX: 36.16 KG/M2

## 2019-06-14 DIAGNOSIS — Z97.5 IUD CONTRACEPTION: ICD-10-CM

## 2019-06-14 DIAGNOSIS — Z30.430 ENCOUNTER FOR INSERTION OF INTRAUTERINE CONTRACEPTIVE DEVICE: Primary | ICD-10-CM

## 2019-06-14 DIAGNOSIS — Z01.812 PRE-PROCEDURE LAB EXAM: ICD-10-CM

## 2019-06-14 DIAGNOSIS — E66.812 OBESITY, CLASS II, BMI 35-39.9, ISOLATED: ICD-10-CM

## 2019-06-14 LAB — HCG UR QL: NEGATIVE

## 2019-06-14 PROCEDURE — 81025 URINE PREGNANCY TEST: CPT | Performed by: OBSTETRICS & GYNECOLOGY

## 2019-06-14 PROCEDURE — 58300 INSERT INTRAUTERINE DEVICE: CPT | Performed by: OBSTETRICS & GYNECOLOGY

## 2019-06-14 PROCEDURE — 99213 OFFICE O/P EST LOW 20 MIN: CPT | Mod: 25 | Performed by: OBSTETRICS & GYNECOLOGY

## 2019-06-14 ASSESSMENT — MIFFLIN-ST. JEOR: SCORE: 1736.31

## 2019-06-14 NOTE — LETTER
To whom it may concern,    Marivel Jones is medically cleared to return to work as of 5/9/19 without any restrictions.      Sincerely,        Karen Ngo MD

## 2019-08-21 ENCOUNTER — OFFICE VISIT (OUTPATIENT)
Dept: OBGYN | Facility: CLINIC | Age: 34
End: 2019-08-21
Payer: COMMERCIAL

## 2019-08-21 VITALS
HEIGHT: 66 IN | BODY MASS INDEX: 35.36 KG/M2 | WEIGHT: 220 LBS | SYSTOLIC BLOOD PRESSURE: 112 MMHG | DIASTOLIC BLOOD PRESSURE: 62 MMHG

## 2019-08-21 DIAGNOSIS — E66.9 OBESITY (BMI 35.0-39.9 WITHOUT COMORBIDITY): Primary | ICD-10-CM

## 2019-08-21 PROCEDURE — 99213 OFFICE O/P EST LOW 20 MIN: CPT | Performed by: OBSTETRICS & GYNECOLOGY

## 2019-08-21 RX ORDER — PHENTERMINE HYDROCHLORIDE 15 MG/1
45 CAPSULE ORAL EVERY MORNING
Qty: 90 CAPSULE | Refills: 0 | Status: SHIPPED | OUTPATIENT
Start: 2019-08-21 | End: 2019-09-25

## 2019-08-21 ASSESSMENT — MIFFLIN-ST. JEOR: SCORE: 1708.63

## 2019-08-21 NOTE — PROGRESS NOTES
SUBJECTIVE:                                                   Marivel Jones is a 34 year old female who presents to clinic today for the following health issue(s):  Patient presents with:  Recheck Medication: patient states doing well on phentermine. refills today      HPI:  Patient has been on phentermine at 37.5mg for about 3 months now. Started it right after her PP visit. Wasn't breastfeeding.   Had lost 50# on it about 3 yrs ago and stopped it just before starting to conceive. Maintained majority of loss during trying to get pregnant but then gained very quickly during pregnancy    Definitely working some and better than nothing but nothing like it was the first time around. Was using it the first month w/o making any dietary changes. Now has actually started eating much healthier and much more like she was the first time around when lost all the weight. Helps with smaller portions and slightly less cravings but not as much    No insomnia. No heart racing. No other side effects    Hasn't been exercising yet. Didn't exercise at all when she lost 50# before either though. Just doesn't have time with 3 kids and working but knows she needs to find time    IUD in for about 2 months. Started spotting on  and has basically every day since. Quite light, mostly brown    Patient's last menstrual period was 2019 (exact date)..   Patient is sexually active, .  Using IUD for contraception.    reports that she has never smoked. She has never used smokeless tobacco.    STD testing offered?  Declined    Health maintenance updated:  yes    Today's PHQ-2 Score: No flowsheet data found.  Today's PHQ-9 Score:   PHQ-9 SCORE 2019   PHQ-9 Total Score 0     Today's NATACHA-7 Score:   NATACHA-7 SCORE 2019   Total Score 0       Problem list and histories reviewed & adjusted, as indicated.  Additional history: as documented.    Patient Active Problem List   Diagnosis     Vitamin D deficiency      "Subclinical hypothyroidism     IUD contraception     Past Surgical History:   Procedure Laterality Date     wisdom teeth  2012      Social History     Tobacco Use     Smoking status: Never Smoker     Smokeless tobacco: Never Used   Substance Use Topics     Alcohol use: Not Currently     Alcohol/week: 0.0 oz     Comment: Not while pregnant      Problem (# of Occurrences) Relation (Name,Age of Onset)    Other Cancer (1) Maternal Grandmother    Thyroid Disease (1) Maternal Grandmother            Current Outpatient Medications   Medication Sig     phentermine (ADIPEX-P) 15 MG capsule Take 3 capsules (45 mg) by mouth every morning     No current facility-administered medications for this visit.      No Known Allergies    ROS:  12 point review of systems negative other than symptoms noted below.    OBJECTIVE:     /62   Ht 1.683 m (5' 6.25\")   Wt 99.8 kg (220 lb)   LMP 08/17/2019 (Exact Date)   BMI 35.24 kg/m    Body mass index is 35.24 kg/m .    Exam:  Constitutional:  Appearance: Well nourished, well developed alert, in no acute distress  Chest:  Respiratory Effort:  Breathing unlabored, CTA bilaterally  Cardiovascular: Heart: Auscultation:  Regular rate, normal rhythm, no murmurs present     In-Clinic Test Results:  No results found for this or any previous visit (from the past 24 hour(s)).    ASSESSMENT/PLAN:                                                        ICD-10-CM    1. Obesity (BMI 35.0-39.9 without comorbidity) E66.9 phentermine (ADIPEX-P) 15 MG capsule         Patient has lost 5# in 2 months and 11# total in 3 months.   Weight loss is much slower this time around compared to last as expected but still felt like she'd be at 15-20# with all the dietary changes the last 2 months  Discussed 20 min of walk/run 3x/week to start. Hopefully would increase length of time and amt of time to 4-5x/week over time as she got it into her routine.  Discussed how to get it in to her routine and some " tips  Discussed dosing of phentermine. Usually 30mg is typical dose however the tolerance/resistance the second time around does make it much harder.   Will try 45mg by taking 15mg and 3 capsules at once. Discussed new onset of si/sx to call about  Return 1 month in f/u    Karen Ngo MD  [facility] FOR [facility]

## 2019-09-24 NOTE — PROGRESS NOTES
SUBJECTIVE:                                                   Marivel Jones is a 34 year old female who presents to clinic today for the following health issue(s):  Patient presents with:  Recheck Medication        HPI:  Patient is now one month in to her 45mg dose of phentermine after having very minimal improvement from 30mg this round. Worked great when she did 30mg and 37.5mg initially a few years ago but then after having the baby and restarting on it had only 4-5# loss in 2 months.  Definitely is awake longer now when she wakes up with the baby but falling asleep fine.  No heart racing, no palpitations, no SOB, no HAs  Definitely helping with portion size and snacking. Gets a lot of calories from dairy with cheese and dip for veggies, etc. Does tend to eat mostly the same things b/c she knows the gayla counts and portions and doesn't mix it up much.  Trying to exercise a bit more. Still very infrequent and nonconsistent but at least once a week and maybe twice a week will take a walk  Feels like she'd need to exercise before work b/c b/c baby is still up middle of the night and then she can't fall asleep, she find she falls back to sleep just before her alarm and then doesn't want to get up early  Drinking plenty of water.  Feels like her goal is 160s. When she lost 50# last time she was 180s and that felt good but for some reason still feels like she'd prefer 160s    No LMP recorded. (Menstrual status: IUD)..   Patient is sexually active, .  Using IUD for contraception.    reports that she has never smoked. She has never used smokeless tobacco.    STD testing offered?  Declined    Health maintenance updated:  yes    Today's PHQ-2 Score: No flowsheet data found.  Today's PHQ-9 Score:   PHQ-9 SCORE 2019   PHQ-9 Total Score 0     Today's NATACHA-7 Score:   NATACHA-7 SCORE 2019   Total Score 0       Problem list and histories reviewed & adjusted, as indicated.  Additional history: as  "documented.    Patient Active Problem List   Diagnosis     Vitamin D deficiency     Subclinical hypothyroidism     IUD contraception     Past Surgical History:   Procedure Laterality Date     wisdom teeth  2012      Social History     Tobacco Use     Smoking status: Never Smoker     Smokeless tobacco: Never Used   Substance Use Topics     Alcohol use: Not Currently     Alcohol/week: 0.0 standard drinks     Comment: Not while pregnant      Problem (# of Occurrences) Relation (Name,Age of Onset)    Other Cancer (1) Maternal Grandmother    Thyroid Disease (1) Maternal Grandmother            Current Outpatient Medications   Medication Sig     phentermine (ADIPEX-P) 15 MG capsule Take 3 capsules (45 mg) by mouth every morning     No current facility-administered medications for this visit.      No Known Allergies    ROS:  12 point review of systems negative other than symptoms noted below.    OBJECTIVE:     /64   Pulse 60   Ht 1.683 m (5' 6.25\")   Wt 97.1 kg (214 lb)   BMI 34.28 kg/m    Body mass index is 34.28 kg/m .    Exam:  Constitutional:  Appearance: Well nourished, well developed alert, in no acute distress  Neck:  Lymph Nodes:  No lymphadenopathy present; Thyroid:  Gland size normal, nontender, no nodules or masses present on palpation  Chest:  Respiratory Effort:  Breathing unlabored, CTA bilaterally  Cardiovascular: Heart: Auscultation:  Regular rate, normal rhythm, no murmurs present     In-Clinic Test Results:  No results found for this or any previous visit (from the past 24 hour(s)).    ASSESSMENT/PLAN:                                                        ICD-10-CM    1. Obesity (BMI 30.0-34.9) E66.9 phentermine (ADIPEX-P) 15 MG capsule         Patient's weight loss has definitely improved this last month on the higher dose  Reviewed ways to add exercise and discussed muscle confusion as well as digestive confusion and not choosing all the same foods she normally would to improve metabolic " rate.  Discussed letting baby cry it out now that she's 6 months so that her sleep isn't broken b/c then will have more energy for exercise and less cortisol release when not sleep deprived which will help as well  Discussed realistic goals for her weight and 160s may not be, 180s could be a perfectly appropriate level to achieve so that doesn't feel like didn't achieve her goal and get discouraged  Also addressed that 45mg is not a dose that has much study or safety testing especially for long term use so that no established length of time to stay on it. At this point it's working, has minimal to no side effects, and her BP is great so will stay on it for now  Return in 8 weeks to f/u once more and will plan to do every 2-3 month appts for accountability and review and medication monitoring    Karen Ngo MD  [facility] FOR [facility]

## 2019-09-25 ENCOUNTER — OFFICE VISIT (OUTPATIENT)
Dept: OBGYN | Facility: CLINIC | Age: 34
End: 2019-09-25
Payer: COMMERCIAL

## 2019-09-25 VITALS
DIASTOLIC BLOOD PRESSURE: 64 MMHG | HEIGHT: 66 IN | SYSTOLIC BLOOD PRESSURE: 110 MMHG | WEIGHT: 214 LBS | HEART RATE: 60 BPM | BODY MASS INDEX: 34.39 KG/M2

## 2019-09-25 DIAGNOSIS — E66.811 OBESITY (BMI 30.0-34.9): Primary | ICD-10-CM

## 2019-09-25 PROCEDURE — 99213 OFFICE O/P EST LOW 20 MIN: CPT | Performed by: OBSTETRICS & GYNECOLOGY

## 2019-09-25 RX ORDER — PHENTERMINE HYDROCHLORIDE 15 MG/1
45 CAPSULE ORAL EVERY MORNING
Qty: 90 CAPSULE | Refills: 1 | Status: SHIPPED | OUTPATIENT
Start: 2019-09-25 | End: 2019-12-04

## 2019-09-25 ASSESSMENT — MIFFLIN-ST. JEOR: SCORE: 1681.42

## 2019-11-07 ENCOUNTER — HEALTH MAINTENANCE LETTER (OUTPATIENT)
Age: 34
End: 2019-11-07

## 2019-12-02 NOTE — PROGRESS NOTES
SUBJECTIVE:                                                   Marivel Jones is a 34 year old female who presents to clinic today for the following health issue(s):  Patient presents with:  Recheck Medication: Phenterimine          HPI:  Patient continues to lose weight on phentermine. It is slower than she was wanting so we did bump her to 45 mg from 30 mg these last couple of months. She also joined [employer] weight watchers  Was eating the exact same things that she had to lose weight on phentermine the first time around when it worked so well and wasn't this time  Since joining [employer] she is realizing she was eating all the wrong foods. Some of her snacks that are easy and portable like trail mix were a huge portion of her entire daily points  Now that aware of that and is mixing it up more her weight loss has improved again  Still not exercising much but knows she needs to. Is trying to walk a bit more, take stairs more often, etc  Baby is now 8 months. Still waking up at least 1-2x/night and knows the sleep deprivation may be part of it too  Still no side effects to the 45 mg and BP is normal. More dry mouth at this dose but tolerable    No LMP recorded. (Menstrual status: IUD)..     Patient is sexually active, .  Using IUD for contraception.    reports that she has never smoked. She has never used smokeless tobacco.    STD testing offered?  Declined    Health maintenance updated:  yes    Today's PHQ-2 Score: No flowsheet data found.  Today's PHQ-9 Score:   PHQ-9 SCORE 2019   PHQ-9 Total Score 0     Today's NATACHA-7 Score:   NATACHA-7 SCORE 2019   Total Score 0       Problem list and histories reviewed & adjusted, as indicated.  Additional history: as documented.    Patient Active Problem List   Diagnosis     Vitamin D deficiency     Subclinical hypothyroidism     IUD contraception     Past Surgical History:   Procedure Laterality Date     wisdom teeth  2012      Social History     Tobacco Use  "    Smoking status: Never Smoker     Smokeless tobacco: Never Used   Substance Use Topics     Alcohol use: Not Currently     Alcohol/week: 0.0 standard drinks     Comment: Not while pregnant      Problem (# of Occurrences) Relation (Name,Age of Onset)    No Known Problems (7) Mother, Father, Sister, Brother, Maternal Grandfather, Paternal Grandmother, Other    Other Cancer (1) Maternal Grandmother    Thyroid Disease (1) Maternal Grandmother            Current Outpatient Medications   Medication Sig     phentermine (ADIPEX-P) 15 MG capsule Take 3 capsules (45 mg) by mouth every morning     No current facility-administered medications for this visit.      No Known Allergies    ROS:  12 point review of systems negative other than symptoms noted below or in the HPI.  No urinary frequency or dysuria, bladder or kidney problems, weight loss, dry mouth      OBJECTIVE:     /70   Pulse 80   Ht 1.683 m (5' 6.25\")   Wt 93.6 kg (206 lb 6.4 oz)   BMI 33.06 kg/m    Body mass index is 33.06 kg/m .    Exam:  Constitutional:  Appearance: Well nourished, well developed alert, in no acute distress  Neck:  Lymph Nodes:  No lymphadenopathy present; Thyroid:  Gland size normal, nontender, no nodules or masses present on palpation  Chest:  Respiratory Effort:  Breathing unlabored. Clear to auscultation bilaterally.   Cardiovascular: Heart: Auscultation:  Regular rate, normal rhythm, no murmurs present  Gastrointestinal:  Abdominal Examination:  Abdomen nontender to palpation, tone normal without rigidity or guarding, no masses present, umbilicus without lesions; Liver/Spleen:  No hepatomegaly present, liver nontender to palpation; Hernias:  No hernias present  Neurologic:  Mental Status:  Oriented X3.  Normal strength and tone, sensory exam grossly normal, mentation intact and speech normal.    Psychiatric:  Mentation appears normal and affect normal/bright.     In-Clinic Test Results:  No results found for this or any previous " visit (from the past 24 hour(s)).    ASSESSMENT/PLAN:                                                        ICD-10-CM    1. Obesity (BMI 30.0-34.9) E66.9 phentermine (ADIPEX-P) 15 MG capsule         Patient is doing well combining 45mg phentermine with changes she's making through weight watchers  Should really work on sleep training the baby so that getting unbroken sleep as that helps with cortisol levels that can make it harder to lose  Also really strongly encouraged some type of exercise at lease 3-4x/week. Can even be for 20 min and can be a cable streamed video on tv, etc. Anything that is for getting HR to a calorie burning level  Will f/u again in 2-3 months and reassess how she's doing  Discussed vyvanse and it's role and it's pros/cons and side effects as well as costs  Will hold off for now since is continuing to lose w/o side effects and normal BP/HR    Karen Ngo MD  [facility] FOR [facility]

## 2019-12-04 ENCOUNTER — OFFICE VISIT (OUTPATIENT)
Dept: OBGYN | Facility: CLINIC | Age: 34
End: 2019-12-04
Payer: COMMERCIAL

## 2019-12-04 VITALS
WEIGHT: 206.4 LBS | HEIGHT: 66 IN | BODY MASS INDEX: 33.17 KG/M2 | HEART RATE: 80 BPM | DIASTOLIC BLOOD PRESSURE: 70 MMHG | SYSTOLIC BLOOD PRESSURE: 120 MMHG

## 2019-12-04 DIAGNOSIS — E66.811 OBESITY (BMI 30.0-34.9): ICD-10-CM

## 2019-12-04 PROCEDURE — 99213 OFFICE O/P EST LOW 20 MIN: CPT | Performed by: OBSTETRICS & GYNECOLOGY

## 2019-12-04 RX ORDER — PHENTERMINE HYDROCHLORIDE 15 MG/1
45 CAPSULE ORAL EVERY MORNING
Qty: 90 CAPSULE | Refills: 2 | Status: SHIPPED | OUTPATIENT
Start: 2019-12-04 | End: 2020-02-10

## 2019-12-04 ASSESSMENT — MIFFLIN-ST. JEOR: SCORE: 1646.94

## 2020-02-07 NOTE — PROGRESS NOTES
"    SUBJECTIVE:                                                   Marivel Jnoes is a 34 year old female who presents to clinic today for the following health issue(s):  Patient presents with:  Recheck Medication      HPI:  Patient is here for 2 month f/u on phentermine. Had been on it in the past and lost 50+#. Went back on it after having her baby and just wasn't losing.  Bumped her to 45mg and patient also started on weight watchers. Realized the foods she'd trained herself were \"good for me\" were really not and were a lot of her total daily points. Switched what she was eating and added the 45mg dose and now has lost 13# in 2 months and much happier.  Still really isn't exercising but has really focused on her diet much more  No insomnia, no headaches, no heart racing. Some dry mouth but tolerable  The baby has just started sleeping throught the night the last few weeks so is actually getting more rest which is helping  Mood is good and more motivation and able to get more done.  Still ideally wants to get to 160's range. Have discussed realistic and achievable goals at different ages and times in our lives and health vs appearance, etc    Patient's last menstrual period was 2020 (exact date)..     Patient is sexually active, .  Using IUD for contraception.    reports that she has never smoked. She has never used smokeless tobacco.    STD testing offered?  Declined    Health maintenance updated:  yes    Today's PHQ-2 Score: No flowsheet data found.  Today's PHQ-9 Score:   PHQ-9 SCORE 2019   PHQ-9 Total Score 0     Today's NATACHA-7 Score:   NATACHA-7 SCORE 2019   Total Score 0       Problem list and histories reviewed & adjusted, as indicated.  Additional history: as documented.    Patient Active Problem List   Diagnosis     Vitamin D deficiency     Subclinical hypothyroidism     IUD contraception     Past Surgical History:   Procedure Laterality Date     wisdom teeth  2012      Social " "History     Tobacco Use     Smoking status: Never Smoker     Smokeless tobacco: Never Used   Substance Use Topics     Alcohol use: Yes     Alcohol/week: 0.0 standard drinks     Comment: Occas       Problem (# of Occurrences) Relation (Name,Age of Onset)    No Known Problems (7) Mother, Father, Sister, Brother, Maternal Grandfather, Paternal Grandmother, Other    Other Cancer (1) Maternal Grandmother    Thyroid Disease (1) Maternal Grandmother            Current Outpatient Medications   Medication Sig     phentermine (ADIPEX-P) 15 MG capsule Take 3 capsules (45 mg) by mouth every morning     No current facility-administered medications for this visit.      No Known Allergies    ROS:  12 point review of systems negative other than symptoms noted below or in the HPI.       OBJECTIVE:     /62   Ht 1.676 m (5' 6\")   Wt 87.7 kg (193 lb 6.4 oz)   LMP 02/05/2020 (Exact Date)   Breastfeeding No   BMI 31.22 kg/m    Body mass index is 31.22 kg/m .    Exam:  Constitutional:  Appearance: Well nourished, well developed alert, in no acute distress  Chest:  Respiratory Effort:  Breathing unlabored. Clear to auscultation bilaterally.   Cardiovascular: Heart: Auscultation:  Regular rate, normal rhythm, no murmurs present  Gastrointestinal:  Abdominal Examination:  Abdomen nontender to palpation, tone normal without rigidity or guarding, no masses present, umbilicus without lesions; Liver/Spleen:  No hepatomegaly present, liver nontender to palpation; Hernias:  No hernias present     In-Clinic Test Results:  No results found for this or any previous visit (from the past 24 hour(s)).    ASSESSMENT/PLAN:                                                        ICD-10-CM    1. Obesity (BMI 30.0-34.9) E66.9 phentermine (ADIPEX-P) 15 MG capsule         Patient is now doing well with weight watchers to help guide dietary choices and the addition of 45mg phentermine  Have discussed that this is a higher dose than is typically " recommended long term but she is tolerating it fine, vitals are normal and she is losing weight at this dose  Refilled her for 2 additional months and then will f/u again to check in on how weight loss is going  Strongly encouraged her to add exercise to her routine at least 3x/week to start and ideally increase from there  Discussed vyvanse and indications/pros/cons/plateau/long term usage/etc    Karen Ngo MD  [facility] FOR [facility] [place]

## 2020-02-10 ENCOUNTER — OFFICE VISIT (OUTPATIENT)
Dept: OBGYN | Facility: CLINIC | Age: 35
End: 2020-02-10
Payer: COMMERCIAL

## 2020-02-10 VITALS
WEIGHT: 193.4 LBS | SYSTOLIC BLOOD PRESSURE: 112 MMHG | BODY MASS INDEX: 31.08 KG/M2 | DIASTOLIC BLOOD PRESSURE: 62 MMHG | HEIGHT: 66 IN

## 2020-02-10 DIAGNOSIS — E66.811 OBESITY (BMI 30.0-34.9): Primary | ICD-10-CM

## 2020-02-10 DIAGNOSIS — Z79.899 HIGH RISK MEDICATION USE: ICD-10-CM

## 2020-02-10 PROCEDURE — 99213 OFFICE O/P EST LOW 20 MIN: CPT | Performed by: OBSTETRICS & GYNECOLOGY

## 2020-02-10 RX ORDER — PHENTERMINE HYDROCHLORIDE 15 MG/1
45 CAPSULE ORAL EVERY MORNING
Qty: 90 CAPSULE | Refills: 1 | Status: SHIPPED | OUTPATIENT
Start: 2020-02-10 | End: 2020-04-13

## 2020-02-10 ASSESSMENT — MIFFLIN-ST. JEOR: SCORE: 1584.01

## 2020-04-13 ENCOUNTER — TELEPHONE (OUTPATIENT)
Dept: OBGYN | Facility: CLINIC | Age: 35
End: 2020-04-13

## 2020-04-13 DIAGNOSIS — E66.811 OBESITY (BMI 30.0-34.9): ICD-10-CM

## 2020-04-13 RX ORDER — PHENTERMINE HYDROCHLORIDE 15 MG/1
45 CAPSULE ORAL EVERY MORNING
Qty: 90 CAPSULE | Refills: 0 | Status: SHIPPED | OUTPATIENT
Start: 2020-04-13 | End: 2020-05-11

## 2020-04-13 NOTE — TELEPHONE ENCOUNTER
Pt requesting refill of Phentermine. Was seen in the office in Feb. Will be completely out this week. Pt transferred to scheduling to make a telephone visit with Dr. Ngo. Will consult with the on call provider for refill until pt can have her telephone visit with Dr. Ngo.  Sol Carpenter RN on 4/13/2020 at 9:39 AM

## 2020-04-13 NOTE — TELEPHONE ENCOUNTER
We could see if she could electronically send it, soco rigged the system to be able to do that.  I can if she can't

## 2020-05-01 NOTE — PROGRESS NOTES
"Marivel Jones is a 34 year old female who is being evaluated via a billable telephone visit.      The patient has been notified of following:     \"This telephone visit will be conducted via a call between you and your physician/provider. We have found that certain health care needs can be provided without the need for a physical exam.  This service lets us provide the care you need with a short phone conversation.  If a prescription is necessary we can send it directly to your pharmacy.  If lab work is needed we can place an order for that and you can then stop by our lab to have the test done at a later time.    Telephone visits are billed at different rates depending on your insurance coverage. During this emergency period, for some insurers they may be billed the same as an in-person visit.  Please reach out to your insurance provider with any questions.    If during the course of the call the physician/provider feels a telephone visit is not appropriate, you will not be charged for this service.\"    Patient has given verbal consent for Telephone visit?  Yes    What phone number would you like to be contacted at? [phone]    How would you like to obtain your AVS? Aspida    Phone call duration: 13 minutes        SUBJECTIVE:                                                   Marivel Jones is a 34 year old female who presents to clinic today for the following health issue(s):  Patient presents with:  Recheck Medication: f/u to phentermine      HPI:  Patient was supposed to have a video visit but was only able to see a black screen on her end with email invite and I couldn't see her at all and then a text invite wouldn't load. Ended up switching to telephone visit.    She has been on phentermine for about 9 months or so since after having her baby. Had been on it for about a year prior. Started on 30mg for the first month or two but then ended up bumping to 45mg and has done that at least " 4-5 months if not longer. Feeling amazing. More energy than she's had in a long time, not tired, motivated, focused.   Still following weight watchers really closely and has started to do 30min of exercise almost every day with a [employer] online workout where mostly dancing to country music.  Previous weight loss attempts have all been medication and diet which worked but then she plateau'd and now is finally exercising b/c of the quarantine and so home more often.  furloughed so able to help more. She can work from home so she is and they're managing the e-learning and the baby who is 14-15 months and doing great.    Is down 20# in 2 months. Hasn't been in the 170s' in recent memory. Was 188# in 2017 based on epic but doesn't recall being lower at any point really  Thrilled with how it's going. A little dry mouth from the meds but o/w no HA, no n/v, mild constipation on occasion but nothing unmanageable. No anxiety, insomnia, tachycardia, etc.    Really wants to stick with it for now so as not to derail her efforts and energy right now    Patient's last menstrual period was 2020 (approximate)..     Patient is sexually active, .  Using IUD for contraception.    reports that she has never smoked. She has never used smokeless tobacco.    STD testing offered?  Declined    Health maintenance updated:  yes    Problem list and histories reviewed & adjusted, as indicated.  Additional history: as documented.    Patient Active Problem List   Diagnosis     Vitamin D deficiency     Subclinical hypothyroidism     IUD contraception     Past Surgical History:   Procedure Laterality Date     wisdom teeth        Social History     Tobacco Use     Smoking status: Never Smoker     Smokeless tobacco: Never Used   Substance Use Topics     Alcohol use: Yes     Alcohol/week: 0.0 standard drinks     Comment: Occas       Problem (# of Occurrences) Relation (Name,Age of Onset)    No Known Problems (7) Mother,  "Father, Sister, Brother, Maternal Grandfather, Paternal Grandmother, Other    Other Cancer (1) Maternal Grandmother    Thyroid Disease (1) Maternal Grandmother            Current Outpatient Medications   Medication Sig     levonorgestrel (MIRENA) 20 MCG/24HR IUD 1 each by Intrauterine route once     phentermine (ADIPEX-P) 15 MG capsule Take 3 capsules (45 mg) by mouth every morning     No current facility-administered medications for this visit.      No Known Allergies    ROS:  No urinary frequency or dysuria, bladder or kidney problems, see above in HPI      OBJECTIVE:     Ht 1.676 m (5' 6\")   Wt 78 kg (172 lb)   LMP 04/20/2020 (Approximate)   BMI 27.76 kg/m    Body mass index is 27.76 kg/m .        ASSESSMENT/PLAN:                                                        ICD-10-CM    1. BMI 27.0-27.9,adult  Z68.27 phentermine (ADIPEX-P) 15 MG capsule   2. High risk medication use  Z79.899          Patient has done absolutely great the last few months of incorporating exercise, following weight watchers, and 45mg/day of phent w/o side effects.  Has an annual scheduled in august and knows she will likely have to slowly wean down/off of phent at some point but just would prefer to do it a bit longer since o/w quarantine is so limiting to her activity/eating/etc  Since doing so well will continue until our annual exam.   Will check a BP at pharmacy whenever she's there next to make sure it's fine. However her BP was great when here last and already on 45mg at that time so should be even better now that down 20#  Refills sent for a couple more months and then f/u at her annual  Karen Ngo MD  [facility] [place]  "

## 2020-05-11 ENCOUNTER — VIRTUAL VISIT (OUTPATIENT)
Dept: OBGYN | Facility: CLINIC | Age: 35
End: 2020-05-11
Payer: COMMERCIAL

## 2020-05-11 VITALS — WEIGHT: 172 LBS | HEIGHT: 66 IN | BODY MASS INDEX: 27.64 KG/M2

## 2020-05-11 DIAGNOSIS — Z79.899 HIGH RISK MEDICATION USE: ICD-10-CM

## 2020-05-11 PROCEDURE — 99213 OFFICE O/P EST LOW 20 MIN: CPT | Mod: TEL | Performed by: OBSTETRICS & GYNECOLOGY

## 2020-05-11 RX ORDER — PHENTERMINE HYDROCHLORIDE 15 MG/1
45 CAPSULE ORAL EVERY MORNING
Qty: 90 CAPSULE | Refills: 1 | Status: SHIPPED | OUTPATIENT
Start: 2020-05-11 | End: 2020-07-06

## 2020-05-11 ASSESSMENT — MIFFLIN-ST. JEOR: SCORE: 1486.94

## 2020-06-26 NOTE — PROGRESS NOTES
Marivel is a 34 year old  female who presents for annual exam.     Besides routine health maintenance, she has no other health concerns today .    HPI:  Has now been doing her phentermine since her baby was about 6-7 weeks old  Started on lower dose and slowly have increased. Most recently has been on 45mg for the last 2 months and is still losing though slower. Had been on 45mg prior to conceiving her daughter and that worked well. Feels great. Has energy, eating healthy, not snacking, walking and active and trying to exercise at least 2 times a week other than being active with the kids. Ideally would really like to get to the 160-170s range but even at this weight is really happy. Worries so much about stopping the med, not even out of fear of weight gain but just the energy it give her to get through her day with working and 3 kids. knows that when she's not on it or runs out for even a day or two she is falling asleep, can't get anything done and just can't afford to have that happen. Has absolutely no side effects from it at all at this dose  Sleeping well. No heart racing, no HAs, nausea, dizziness, agitation, etc.    General health is good. Periods are regular and monthly. Last 4 days and quite light but still getting them every month. Had a mirena in past and had amenorrhea so hoped she would with this one as well but hasn't yet at the just over a year pan    Fasting for labs today as hasn't done them for awhile during pregnancies and would like to do that    No other concerns and all three kids are doing great. Just waiting to hear what the school year will look like b/c distance learning was really difficult and the boys didn't do well with it      GYNECOLOGIC HISTORY:    Patient's last menstrual period was 2020 (exact date).    Regular menses? yes  Menses every 28 days.  Length of menses: 4 days    Her current contraception method is: IUD.  She  reports that she has never smoked. She has  never used smokeless tobacco.    Patient is sexually active.  STD testing offered?  Declined  Last PHQ-9 score on record =   PHQ-9 SCORE 2020   PHQ-9 Total Score 0     Last GAD7 score on record =   NATACHA-7 SCORE 2020   Total Score 0     Alcohol Score = 2    HEALTH MAINTENANCE:  Cholesterol:    Recent Labs   Lab Test 06/19/15  0718   CHOL 159   HDL 56   LDL 90   TRIG 66   CHOLHDLRATIO 2.8     TSH   Date Value Ref Range Status   2020 2.77 0.40 - 4.00 mU/L Final       Last Mammo: Not applicable, Result: Not applicable, Next Mammo: Due at age 38   Pap:   Lab Results   Component Value Date    PAP NIL, HPV- 2019     Colonoscopy:  NA, Result: Not applicable, Next Colonoscopy: Due age 48  Dexa:  NA    Health maintenance updated:  yes    HISTORY:  OB History    Para Term  AB Living   3 3 3 0 0 3   SAB TAB Ectopic Multiple Live Births   0 0 0 0 3      # Outcome Date GA Lbr Efren/2nd Weight Sex Delivery Anes PTL Lv   3 Term 19 37w0d 04:20 / 00:18 2.985 kg (6 lb 9.3 oz) F Vag-Spont EPI N CHANDNI      Birth Comments: followed and delivered by [facility]. PROM with pit aug, pushed one time, 1st degree lac repaired      Name: Daxa      Apgar1: 9  Apgar5: 9   2 Term / 39w3d 02:20 / 01:00 4.04 kg (8 lb 14.5 oz) M Vag-Spont EPI  CHANDNI      Birth Comments: followed and delivered by [facility]. elective indux. presume macrosomia and advanced cvx dilation. done at 39+3. labor was 2 hr 20min but actively pushed for an hour      Name: Case      Apgar1: 9  Apgar5: 9   1 Term 11 39w0d  3.827 kg (8 lb 7 oz) M Vag-Vacuum EPI  CHANDNI      Birth Comments: Followed and delivered by [facility]. Elective indux with favorable cvx and presumed macrosomia. Vac done for exhaustion and arrest @+2. 2 pulls and no popoffs, long second stage over 4 hrs      Name: Pina       Patient Active Problem List   Diagnosis     Vitamin D deficiency     Subclinical hypothyroidism     IUD contraception     Past Surgical History:  "  Procedure Laterality Date     wisdom teeth  2012      Social History     Tobacco Use     Smoking status: Never Smoker     Smokeless tobacco: Never Used   Substance Use Topics     Alcohol use: Yes     Alcohol/week: 0.0 standard drinks     Comment: Occas       Problem (# of Occurrences) Relation (Name,Age of Onset)    No Known Problems (7) Mother, Father, Sister, Brother, Maternal Grandfather, Paternal Grandmother, Other    Other Cancer (1) Maternal Grandmother    Thyroid Disease (1) Maternal Grandmother            Current Outpatient Medications   Medication Sig     levonorgestrel (MIRENA) 20 MCG/24HR IUD 1 each by Intrauterine route once     phentermine (ADIPEX-P) 15 MG capsule Take 3 capsules (45 mg) by mouth every morning     vitamin D3 (CHOLECALCIFEROL) 1.25 MG (32579 UT) capsule Take 1 capsule (50,000 Units) by mouth once a week for 8 doses     No current facility-administered medications for this visit.      No Known Allergies    Past medical, surgical, social and family histories were reviewed and updated in EPIC.    ROS:   12 point review of systems negative other than symptoms noted below or in the HPI.  No urinary frequency or dysuria, bladder or kidney problems, Normal menstrual cycles    EXAM:  /64   Pulse 70   Ht 1.695 m (5' 6.75\")   Wt 75.8 kg (167 lb)   LMP 06/29/2020 (Exact Date)   BMI 26.35 kg/m     BMI: Body mass index is 26.35 kg/m .    PHYSICAL EXAM:  Constitutional:   Appearance: Well nourished, well developed, alert, in no acute distress  Neck:  Lymph Nodes:  No lymphadenopathy present    Thyroid:  Gland size normal, nontender, no nodules or masses present  on palpation  Chest:  Respiratory Effort:  Breathing unlabored  Cardiovascular:    Heart: Auscultation:  Regular rate, normal rhythm, no murmurs present  Breasts: Palpation of Breasts and Axillae:  No masses present on palpation, no breast tenderness. and No nodularity, asymmetry or nipple discharge " bilaterally.  Gastrointestinal:   Abdominal Examination:  Abdomen nontender to palpation, tone normal without rigidity or guarding, no masses present, umbilicus without lesions   Liver and Spleen:  No hepatomegaly present, liver nontender to palpation    Hernias:  No hernias present  Lymphatic: Lymph Nodes:  No other lymphadenopathy present  Skin:  General Inspection:  No rashes present, no lesions present, no areas of  discoloration  Neurologic:    Mental Status:  Oriented X3.  Normal strength and tone, sensory exam                grossly normal, mentation intact and speech normal.    Psychiatric:   Mentation appears normal and affect normal/bright.         Pelvic Exam:  External Genitalia:     Normal appearance for age, no discharge present, no tenderness present, no inflammatory lesions present, color normal  Vagina:    Normal vaginal vault without central or paravaginal defects, no discharge present, no inflammatory lesions present, no masses present  Bladder:     Nontender to palpation  Urethra:   Urethral Body:  Urethra palpation normal, urethra structural support normal   Urethral Meatus:  No erythema or lesions present  Cervix:     Appearance healthy, no lesions present, nontender to palpation, no bleeding present, string present  Uterus:     Nontender to palpation, no masses present, position anteflexed, mobility: normal  Adnexa:     No adnexal tenderness present, no adnexal masses present  Perineum:     Perineum within normal limits, no evidence of trauma, no rashes or skin lesions present  Anus:     Anus within normal limits, no hemorrhoids present  Inguinal Lymph Nodes:     No lymphadenopathy present  Pubic Hair:     Normal pubic hair distribution for age  Genitalia and Groin:     No rashes present, no lesions present, no areas of discoloration, no masses present      COUNSELING:   Reviewed preventive health counseling, as reflected in patient instructions  Special attention given to:        Regular  exercise       Healthy diet/nutrition    BMI: Body mass index is 26.35 kg/m .  Weight management plan: continue medication at this time as doing well, continues to lose weight, normal vitals and exam    ASSESSMENT:  34 year old female with satisfactory annual exam.    ICD-10-CM    1. Encounter for gynecological examination without abnormal finding  Z01.419    2. Presence of 52 mg levonorgestrel-releasing intrauterine device (IUD)  Z97.5    3. BMI 27.0-27.9,adult  Z68.27 phentermine (ADIPEX-P) 15 MG capsule   4. High risk medication use  Z79.899    5. Vitamin D deficiency  E55.9 vitamin D3 (CHOLECALCIFEROL) 1.25 MG (71245 UT) capsule   6. Elevated bilirubin  R17 **Hepatic panel FUTURE 2mo     **Bilirubin Direct and Total FUTURE 14d   7. Encounter for lipid screening for cardiovascular disease  Z13.220 Lipid panel reflex to direct LDL Fasting    Z13.6    8. Screening for metabolic disorder  Z13.228 Comprehensive metabolic panel   9. Screening for thyroid disorder  Z13.29 TSH with free T4 reflex   10. Encounter for vitamin deficiency screening  Z13.21 Vitamin D Deficiency       PLAN:  Pap is UTD for 4 yrs  Fasting labs today along with vit D, thyroid  Refill phentermine at 45mg daily (15mg x3) and continue to work and maintain her healthy eating and exercise.  Addressed that long term use is not recommended even at 30mg much less 45mg and discussed the potential risks of long term use that she has no si/sx of at this time.  Discussed eventual possible wean but also understand the fatigue and motivation and energy that she has from it so will continue it for now as long as understands the potential risk. Patient is aware and understands and agreeable to proceed b/c really prefers to be on it.  Reassured that her IUD may still bring her amenorrhea with more time but to continue to monitor for now    Spent an additional 10 min, 100% of which was in face to face counseling time, on top of her annual to address the  phentermine and weight loss, etc  Will f/u with me in 4 to 6 months or so and reassess need/dose at that time    Karen Ngo MD

## 2020-07-06 ENCOUNTER — OFFICE VISIT (OUTPATIENT)
Dept: OBGYN | Facility: CLINIC | Age: 35
End: 2020-07-06
Payer: COMMERCIAL

## 2020-07-06 VITALS
WEIGHT: 167 LBS | BODY MASS INDEX: 26.21 KG/M2 | HEIGHT: 67 IN | DIASTOLIC BLOOD PRESSURE: 64 MMHG | HEART RATE: 70 BPM | SYSTOLIC BLOOD PRESSURE: 108 MMHG

## 2020-07-06 DIAGNOSIS — Z13.228 SCREENING FOR METABOLIC DISORDER: ICD-10-CM

## 2020-07-06 DIAGNOSIS — Z13.220 ENCOUNTER FOR LIPID SCREENING FOR CARDIOVASCULAR DISEASE: ICD-10-CM

## 2020-07-06 DIAGNOSIS — Z13.29 SCREENING FOR THYROID DISORDER: ICD-10-CM

## 2020-07-06 DIAGNOSIS — R17 ELEVATED BILIRUBIN: ICD-10-CM

## 2020-07-06 DIAGNOSIS — E55.9 VITAMIN D DEFICIENCY: ICD-10-CM

## 2020-07-06 DIAGNOSIS — Z13.21 ENCOUNTER FOR VITAMIN DEFICIENCY SCREENING: ICD-10-CM

## 2020-07-06 DIAGNOSIS — Z01.419 ENCOUNTER FOR GYNECOLOGICAL EXAMINATION WITHOUT ABNORMAL FINDING: Primary | ICD-10-CM

## 2020-07-06 DIAGNOSIS — Z13.6 ENCOUNTER FOR LIPID SCREENING FOR CARDIOVASCULAR DISEASE: ICD-10-CM

## 2020-07-06 DIAGNOSIS — Z97.5 PRESENCE OF 52 MG LEVONORGESTREL-RELEASING INTRAUTERINE DEVICE (IUD): ICD-10-CM

## 2020-07-06 DIAGNOSIS — Z79.899 HIGH RISK MEDICATION USE: ICD-10-CM

## 2020-07-06 PROCEDURE — 80053 COMPREHEN METABOLIC PANEL: CPT | Performed by: OBSTETRICS & GYNECOLOGY

## 2020-07-06 PROCEDURE — 99395 PREV VISIT EST AGE 18-39: CPT | Performed by: OBSTETRICS & GYNECOLOGY

## 2020-07-06 PROCEDURE — 99213 OFFICE O/P EST LOW 20 MIN: CPT | Mod: 25 | Performed by: OBSTETRICS & GYNECOLOGY

## 2020-07-06 PROCEDURE — 36415 COLL VENOUS BLD VENIPUNCTURE: CPT | Performed by: OBSTETRICS & GYNECOLOGY

## 2020-07-06 PROCEDURE — 80061 LIPID PANEL: CPT | Performed by: OBSTETRICS & GYNECOLOGY

## 2020-07-06 PROCEDURE — 82306 VITAMIN D 25 HYDROXY: CPT | Performed by: OBSTETRICS & GYNECOLOGY

## 2020-07-06 PROCEDURE — 84443 ASSAY THYROID STIM HORMONE: CPT | Performed by: OBSTETRICS & GYNECOLOGY

## 2020-07-06 RX ORDER — PHENTERMINE HYDROCHLORIDE 15 MG/1
45 CAPSULE ORAL EVERY MORNING
Qty: 90 CAPSULE | Refills: 5 | Status: SHIPPED | OUTPATIENT
Start: 2020-07-06 | End: 2021-01-06

## 2020-07-06 ASSESSMENT — ANXIETY QUESTIONNAIRES
1. FEELING NERVOUS, ANXIOUS, OR ON EDGE: NOT AT ALL
5. BEING SO RESTLESS THAT IT IS HARD TO SIT STILL: NOT AT ALL
3. WORRYING TOO MUCH ABOUT DIFFERENT THINGS: NOT AT ALL
GAD7 TOTAL SCORE: 0
2. NOT BEING ABLE TO STOP OR CONTROL WORRYING: NOT AT ALL
IF YOU CHECKED OFF ANY PROBLEMS ON THIS QUESTIONNAIRE, HOW DIFFICULT HAVE THESE PROBLEMS MADE IT FOR YOU TO DO YOUR WORK, TAKE CARE OF THINGS AT HOME, OR GET ALONG WITH OTHER PEOPLE: NOT DIFFICULT AT ALL
6. BECOMING EASILY ANNOYED OR IRRITABLE: NOT AT ALL
7. FEELING AFRAID AS IF SOMETHING AWFUL MIGHT HAPPEN: NOT AT ALL

## 2020-07-06 ASSESSMENT — PATIENT HEALTH QUESTIONNAIRE - PHQ9
5. POOR APPETITE OR OVEREATING: NOT AT ALL
SUM OF ALL RESPONSES TO PHQ QUESTIONS 1-9: 0

## 2020-07-06 ASSESSMENT — MIFFLIN-ST. JEOR: SCORE: 1476.17

## 2020-07-07 LAB
ALBUMIN SERPL-MCNC: 4.2 G/DL (ref 3.4–5)
ALP SERPL-CCNC: 76 U/L (ref 40–150)
ALT SERPL W P-5'-P-CCNC: 23 U/L (ref 0–50)
ANION GAP SERPL CALCULATED.3IONS-SCNC: 6 MMOL/L (ref 3–14)
AST SERPL W P-5'-P-CCNC: 12 U/L (ref 0–45)
BILIRUB SERPL-MCNC: 1.8 MG/DL (ref 0.2–1.3)
BUN SERPL-MCNC: 11 MG/DL (ref 7–30)
CALCIUM SERPL-MCNC: 8.8 MG/DL (ref 8.5–10.1)
CHLORIDE SERPL-SCNC: 106 MMOL/L (ref 94–109)
CHOLEST SERPL-MCNC: 138 MG/DL
CO2 SERPL-SCNC: 25 MMOL/L (ref 20–32)
CREAT SERPL-MCNC: 0.84 MG/DL (ref 0.52–1.04)
GFR SERPL CREATININE-BSD FRML MDRD: 89 ML/MIN/{1.73_M2}
GLUCOSE SERPL-MCNC: 80 MG/DL (ref 70–99)
HDLC SERPL-MCNC: 56 MG/DL
LDLC SERPL CALC-MCNC: 71 MG/DL
NONHDLC SERPL-MCNC: 82 MG/DL
POTASSIUM SERPL-SCNC: 4.3 MMOL/L (ref 3.4–5.3)
PROT SERPL-MCNC: 8.1 G/DL (ref 6.8–8.8)
SODIUM SERPL-SCNC: 137 MMOL/L (ref 133–144)
TRIGL SERPL-MCNC: 56 MG/DL
TSH SERPL DL<=0.005 MIU/L-ACNC: 2.77 MU/L (ref 0.4–4)

## 2020-07-07 ASSESSMENT — ANXIETY QUESTIONNAIRES: GAD7 TOTAL SCORE: 0

## 2020-07-08 LAB — DEPRECATED CALCIDIOL+CALCIFEROL SERPL-MC: 19 UG/L (ref 20–75)

## 2020-08-18 ENCOUNTER — TRANSFERRED RECORDS (OUTPATIENT)
Dept: HEALTH INFORMATION MANAGEMENT | Facility: CLINIC | Age: 35
End: 2020-08-18

## 2020-08-28 DIAGNOSIS — E55.9 VITAMIN D DEFICIENCY: ICD-10-CM

## 2020-08-28 RX ORDER — METHOCARBAMOL 750 MG/1
TABLET ORAL
Qty: 8 CAPSULE | Refills: 0 | OUTPATIENT
Start: 2020-08-28

## 2020-08-28 NOTE — TELEPHONE ENCOUNTER
"Requested Prescriptions   Pending Prescriptions Disp Refills     D3-50 1.25 MG (67380 UT) capsule [Pharmacy Med Name: VITAMIN D3-50 50,000 UNIT CAP] 8 capsule 0     Sig: TAKE 1 CAPSULE (50,000 UNITS) BY MOUTH ONCE A WEEK FOR 8 DOSES       Vitamin Supplements (Adult) Protocol Failed - 8/28/2020 12:12 AM        Failed - High dose Vitamin D not ordered        Passed - Recent (12 mo) or future (30 days) visit within the authorizing provider's specialty     Patient has had an office visit with the authorizing provider or a provider within the authorizing providers department within the previous 12 mos or has a future within next 30 days. See \"Patient Info\" tab in inbasket, or \"Choose Columns\" in Meds & Orders section of the refill encounter.              Passed - Medication is active on med list           Last Written Prescription Date:  7/8/20  Last Fill Quantity: 8,  # refills: 0   Last office visit: 7/6/2020 with prescribing provider:  Dr Ngo   Future Office Visit:      Needs to repeat Vit D at 8 weeks after 7/6/20; daily OTC Vit D3 2000IU daily was recommended.  Refused refill as not appropriate - only short term tx.    Anum Giles RN on 8/28/2020 at 10:50 AM      "

## 2020-08-31 DIAGNOSIS — R17 ELEVATED BILIRUBIN: ICD-10-CM

## 2020-08-31 LAB
ALBUMIN SERPL-MCNC: 4.1 G/DL (ref 3.4–5)
ALP SERPL-CCNC: 69 U/L (ref 40–150)
ALT SERPL W P-5'-P-CCNC: 16 U/L (ref 0–50)
AST SERPL W P-5'-P-CCNC: 10 U/L (ref 0–45)
BILIRUB DIRECT SERPL-MCNC: 0.2 MG/DL (ref 0–0.2)
BILIRUB SERPL-MCNC: 1 MG/DL (ref 0.2–1.3)
PROT SERPL-MCNC: 8 G/DL (ref 6.8–8.8)

## 2020-08-31 PROCEDURE — 36415 COLL VENOUS BLD VENIPUNCTURE: CPT | Performed by: OBSTETRICS & GYNECOLOGY

## 2020-08-31 PROCEDURE — 80076 HEPATIC FUNCTION PANEL: CPT | Performed by: OBSTETRICS & GYNECOLOGY

## 2020-12-06 ENCOUNTER — HEALTH MAINTENANCE LETTER (OUTPATIENT)
Age: 35
End: 2020-12-06

## 2021-01-05 NOTE — PROGRESS NOTES
SUBJECTIVE:                                                   Marivel Jones is a 35 year old female who presents to clinic today for the following health issue(s):  Patient presents with:  Recheck Medication: Phentermine 45mg follow up         HPI:  Patient has now been on 45mg phentermine for about 5-6 months  Started on 15mg and on 30mg for quite a while and then bumped to 45mg when hit a plateau and now has been on that  Has no side effects at all. No tachycardia, no palpitations, no insomnia, HAs, nausea, SOB  Feels like her appetite suppression isn't nearly as pronounced after all this time as initially but has allowed her to maintain her weight loss and have more energy, more focus, accomplishes the household and parenting tasks while still being able to work  Generally feels like makes her mood better    Has some trouble sleeping but has since having her daughter. Falling asleep is fine but then wakes at 3am and tosses and turns and feels like never in a deep sleep until alarm goes off. The baby is now sleeping through the night but did take a while to get there and yet she isn't able to sleep through the night  Overall does feel rested enough and definitely knows that the phentermine helps her to have energy after not having deep REM sleep overnight    Is working out at least 4x/week and sometimes more. Was doing beach body mostly cardio stuff when started to add exercise a year ago. Now is doing more weight and resistance training. Usually only 20-30 min when works out but feels like that's better than not doing it  Is generally still following an overall weight watchers approach with being aware of point totals for foods and knowing what types of foods to avoid like high carbs/processed foods, etc    Feeling overall really good. Ideally really has set a goal of being in the 150's and knows that may not be attainable  Has considered tummy tuck and other plastic surgery to remove some of the  "loose skin after kids and weight loss but not sure she would do that    No LMP recorded. (Menstrual status: IUD).    Patient is sexually active, .  Using IUD for contraception.   reports that she has never smoked. She has never used smokeless tobacco.    Health maintenance updated:  yes    Today's PHQ-2 Score: No flowsheet data found.  Today's PHQ-9 Score:   PHQ-9 SCORE 2020   PHQ-9 Total Score 0     Today's NATACHA-7 Score:   NATACHA-7 SCORE 2020   Total Score 0       Problem list and histories reviewed & adjusted, as indicated.  Additional history: as documented.    Patient Active Problem List   Diagnosis     Vitamin D deficiency     Subclinical hypothyroidism     IUD contraception     Past Surgical History:   Procedure Laterality Date     wisdom teeth        Social History     Tobacco Use     Smoking status: Never Smoker     Smokeless tobacco: Never Used   Substance Use Topics     Alcohol use: Yes     Alcohol/week: 0.0 standard drinks     Comment: Occas       Problem (# of Occurrences) Relation (Name,Age of Onset)    No Known Problems (7) Mother, Father, Sister, Brother, Maternal Grandfather, Paternal Grandmother, Other    Other Cancer (1) Maternal Grandmother    Thyroid Disease (1) Maternal Grandmother            Current Outpatient Medications   Medication Sig     levonorgestrel (MIRENA) 20 MCG/24HR IUD 1 each by Intrauterine route once     phentermine (ADIPEX-P) 15 MG capsule Take 3 capsules (45 mg) by mouth every morning     No current facility-administered medications for this visit.      No Known Allergies    ROS:  12 point review of systems negative other than symptoms noted below or in the HPI.  No urinary frequency or dysuria, bladder or kidney problems, Normal menstrual cycles      OBJECTIVE:     /78   Ht 1.695 m (5' 6.75\")   Wt 75.8 kg (167 lb)   BMI 26.35 kg/m    Body mass index is 26.35 kg/m .    Exam:  Constitutional:  Appearance: Well nourished, well developed alert, in no acute " distress  Neck:  Lymph Nodes:  No lymphadenopathy present; Thyroid:  Gland size normal, nontender, no nodules or masses present on palpation  Chest:  Respiratory Effort:  Breathing unlabored. Clear to auscultation bilaterally.   Cardiovascular: Heart: Auscultation:  Regular rate, normal rhythm, no murmurs present     In-Clinic Test Results:  No results found for this or any previous visit (from the past 24 hour(s)).    ASSESSMENT/PLAN:                                                        ICD-10-CM    1. Long-term current use of high risk medication other than anticoagulant  Z79.899    2. Vitamin D deficiency  E55.9 Vitamin D Deficiency   3. BMI 26.0-26.9,adult  Z68.26 phentermine (ADIPEX-P) 15 MG capsule   4. History of morbid obesity  Z87.898          Patient is doing great with maintaining her weight loss  Her current weight is 167# and it was this in summer when here last.   BMI is now 26 and was 231# a couple months after having the baby which was a BMI of 37  Again reviewed long term stimulant use and the potential risks for heart health, etc  However her overall CV health is much better with her current weight and BMI and her BP is great and normal heart and lung exam w/o rekha  Reviewed that scaling back to 30mg may feel not as appetite suppressing but likely would still allow her to maintain her loss. Losing more would still be difficult just from medication alone  Scaling back to 15mg or off would likely just decrease basal metabolic rate and lead to weight gain even if continued to eat and exercise as she has and she really doesn't want that    Feel that benefits outweigh risks and will continue her on 45mg daily  Refill sent for 3 months and 1 RF which will get her almost to her annual exam   Can get a one month extension after 6 months and then can f/u at annual    Patient also had low D and that was in august  Did the 50k weekly for 8 weeks and now taking 1000 international unit(s)/day  Will recheck D  to see if adequately supplementing    Karen Ngo MD  [facility] [place]

## 2021-01-05 NOTE — PROGRESS NOTES
Syphilis is a sexually transmitted disease that can cause birth defects in the babies of untreated mothers. Every pregnant patient is tested for syphilis early in each pregnancy as part of the routine lab work. The [place] Department of [facility] has seen an increase in the rate of syphilis in [place]. The [facility] now recommends testing for syphilis 3 times during a pregnancy, the new prenatal visit, 28 weeks and when admitted for delivery. Patient accepts lab testing for syphilis.     Admission

## 2021-01-06 ENCOUNTER — OFFICE VISIT (OUTPATIENT)
Dept: OBGYN | Facility: CLINIC | Age: 36
End: 2021-01-06
Payer: COMMERCIAL

## 2021-01-06 VITALS
BODY MASS INDEX: 26.21 KG/M2 | WEIGHT: 167 LBS | DIASTOLIC BLOOD PRESSURE: 78 MMHG | HEIGHT: 67 IN | SYSTOLIC BLOOD PRESSURE: 112 MMHG

## 2021-01-06 DIAGNOSIS — Z86.39 HISTORY OF MORBID OBESITY: ICD-10-CM

## 2021-01-06 DIAGNOSIS — Z79.899 LONG-TERM CURRENT USE OF HIGH RISK MEDICATION OTHER THAN ANTICOAGULANT: Primary | ICD-10-CM

## 2021-01-06 DIAGNOSIS — E55.9 VITAMIN D DEFICIENCY: ICD-10-CM

## 2021-01-06 LAB — DEPRECATED CALCIDIOL+CALCIFEROL SERPL-MC: 34 UG/L (ref 20–75)

## 2021-01-06 PROCEDURE — 99213 OFFICE O/P EST LOW 20 MIN: CPT | Performed by: OBSTETRICS & GYNECOLOGY

## 2021-01-06 PROCEDURE — 82306 VITAMIN D 25 HYDROXY: CPT | Performed by: OBSTETRICS & GYNECOLOGY

## 2021-01-06 PROCEDURE — 36415 COLL VENOUS BLD VENIPUNCTURE: CPT | Performed by: OBSTETRICS & GYNECOLOGY

## 2021-01-06 RX ORDER — PHENTERMINE HYDROCHLORIDE 15 MG/1
45 CAPSULE ORAL EVERY MORNING
Qty: 90 CAPSULE | Refills: 5 | Status: SHIPPED | OUTPATIENT
Start: 2021-01-06 | End: 2021-07-07

## 2021-01-06 ASSESSMENT — MIFFLIN-ST. JEOR: SCORE: 1471.17

## 2021-01-27 ENCOUNTER — TELEPHONE (OUTPATIENT)
Dept: OBGYN | Facility: CLINIC | Age: 36
End: 2021-01-27

## 2021-01-27 NOTE — TELEPHONE ENCOUNTER
Prior Authorization Retail Medication Request    Medication/Dose: phentermine (ADIPEX-P) 15 MG capsule (3 capsules, 45 MG daily)  ICD code:BMI 26.0-26.9,adult [Z68.26]   Previously Tried and Failed:  N/A  Rationale:  Patient was started on this med 5/8/19 with regular follow-up visits.    Office Visit 1/6/21    Patient has now been on 45mg phentermine for about 5-6 months  Started on 15mg and on 30mg for quite a while and then bumped to 45mg when hit a plateau and now has been on that. Has no side effects at all. No tachycardia, no palpitations, no insomnia, HAs, nausea, SOB    Patient is doing great with maintaining her weight loss  Her current weight is 167# and it was this in summer when here last.   BMI is now 26 and was 231# a couple months after having the baby which was a BMI of 37  Again reviewed long term stimulant use and the potential risks for heart health, etc  However her overall CV health is much better with her current weight and BMI and her BP is great and normal heart and lung exam w/o tachy    Insurance Name:  [employer]  Insurance ID:  [id number]

## 2021-01-28 NOTE — TELEPHONE ENCOUNTER
Central Prior Authorization Team   Phone: [phone]      PA Initiation    Medication: phentermine (ADIPEX-P) 15 MG capsule (3 capsules, 45 MG daily)-Initiated  Insurance Company: [employer] - Phone [phone] Fax [phone]  Pharmacy Filling the Rx: [id number] [place] [facility] - [address] [place] [address]  Filling Pharmacy Phone: [phone]  Filling Pharmacy Fax:    Start Date: 1/28/2021

## 2021-02-01 NOTE — TELEPHONE ENCOUNTER
Prior Authorization Approval    Authorization Effective Date: 1/2/2021  Authorization Expiration Date: 2/1/2022  Medication: phentermine (ADIPEX-P) 15 MG capsule (3 capsules, 45 MG daily)-APPROVED  Approved Dose/Quantity:   Reference #:     Insurance Company: [employer] - Phone [phone] Fax [phone]  Expected CoPay:       CoPay Card Available:      Foundation Assistance Needed:    Which Pharmacy is filling the prescription (Not needed for infusion/clinic administered): [id number] [place] [place] - [address] [facility]  Pharmacy Notified: Yes  Patient Notified: No    Pharmacy will notify patient when medication is ready.

## 2021-02-01 NOTE — TELEPHONE ENCOUNTER
Called and spoke with Marlene at insurance to see why case was cancelled, she doesn't see why.   A new PA was completed via phone.

## 2021-07-06 NOTE — PROGRESS NOTES
Marivel is a 35 year old  female who presents for annual exam.     Besides routine health maintenance, she has no other health concerns today.    HPI:  The patient's PCP is Dr. Karen Ngo MD.      Patient is overall doing great. Is still taking her phentermine 45mg qam. Has gained about 9# since here in January but still really feels like it helps with portion control, motivation for exercise and being generally active, not impulse eating and snacking as much, etc. Had discussed 30mg in the morning and then 15mg later in the day to help with evening hunger but just never tried it and unsure why.    Fasting labs all normal  other than low D of 19 and high bili of 1.8. repeated 4-6 weeks later and the bili was normal as was the whole hepatic panel. Her D was up to 34. Supplemented D through the winter but stopped in May thinking summer/sun would be adequate. Open to rechecking them today    mirena iud for 2 yrs. Will have a couple months w/o a period and then just had a period last week. Very light, no more than 3 days. Rarely even needs a panty liner and more just a bit of blood when she wipes. Never needs tampons and never liked them anyway b/c uncomfortable so this has been just fine. No cramping or PMS of any significance, so very helpful.    Hasn't gottten a covid vaxx yet. Just worried about it's newness and long term effects. Usually completely on board with vaccination but just worried about this one for some reason      GYNECOLOGIC HISTORY:    Patient's last menstrual period was 2021.    Regular menses? yes  Menses every 28-30 days.  Length of menses: 3 days    Her current contraception method is: IUD.  She  reports that she has never smoked. She has never used smokeless tobacco.    Patient is sexually active.  STD testing offered?  Declined    Last PHQ-9 score on record =   PHQ-9 SCORE 2021   PHQ-9 Total Score 0     Last GAD7 score on record =   NATACHA-7 SCORE 2021   Total Score 0      Alcohol Score = 2    HEALTH MAINTENANCE:  Cholesterol:   Recent Labs   Lab Test 20  1618 06/19/15  0718   CHOL 138 159   HDL 56 56   LDL 71 90   TRIG 56 66   CHOLHDLRATIO  --  2.8     TSH   Date Value Ref Range Status   2020 2.77 0.40 - 4.00 mU/L Final     Last Mammo: Not applicable, Next Mammo: Due at age 38   Pap:   Lab Results   Component Value Date    PAP NIL NEG-HPV 2019      Colonoscopy: N/A, Next Colonoscopy: Due at age 43   Dexa:  N/A    Health maintenance updated:  yes    HISTORY:  OB History    Para Term  AB Living   3 3 3 0 0 3   SAB TAB Ectopic Multiple Live Births   0 0 0 0 3      # Outcome Date GA Lbr Efren/2nd Weight Sex Delivery Anes PTL Lv   3 Term 19 37w0d 04:20 / 00:18 2.985 kg (6 lb 9.3 oz) F Vag-Spont EPI N CHANDNI      Birth Comments: followed and delivered by [facility]. PROM with pit aug, pushed one time, 1st degree lac repaired      Name: Daxa      Apgar1: 9  Apgar5: 9   2 Term 13 39w3d 02:20 / 01:00 4.04 kg (8 lb 14.5 oz) M Vag-Spont EPI  CHANDNI      Birth Comments: followed and delivered by [facility]. elective indux. presume macrosomia and advanced cvx dilation. done at 39+3. labor was 2 hr 20min but actively pushed for an hour      Name: Case      Apgar1: 9  Apgar5: 9   1 Term 11 39w0d  3.827 kg (8 lb 7 oz) M Vag-Vacuum EPI  CHANDNI      Birth Comments: Followed and delivered by [facility]. Elective indux with favorable cvx and presumed macrosomia. Vac done for exhaustion and arrest @+2. 2 pulls and no popoffs, long second stage over 4 hrs      Name: Pina       Patient Active Problem List   Diagnosis     Vitamin D deficiency     Subclinical hypothyroidism     IUD contraception     Past Surgical History:   Procedure Laterality Date     wisdom teeth  2012      Social History     Tobacco Use     Smoking status: Never Smoker     Smokeless tobacco: Never Used   Substance Use Topics     Alcohol use: Yes     Alcohol/week: 0.0 standard drinks     Comment:  "Occas       Problem (# of Occurrences) Relation (Name,Age of Onset)    No Known Problems (7) Mother, Father, Sister, Brother, Maternal Grandfather, Paternal Grandmother, Other    Other Cancer (1) Maternal Grandmother    Thyroid Disease (1) Maternal Grandmother            Current Outpatient Medications   Medication Sig     levonorgestrel (MIRENA) 20 MCG/24HR IUD 1 each by Intrauterine route once     phentermine (ADIPEX-P) 15 MG capsule Take 3 capsules (45 mg) by mouth every morning     No current facility-administered medications for this visit.      No Known Allergies    Past medical, surgical, social and family histories were reviewed and updated in EPIC.    ROS:   12 point review of systems negative other than symptoms noted below or in the HPI.  No urinary frequency or dysuria, bladder or kidney problems    EXAM:  /80   Ht 1.702 m (5' 7\")   Wt 79.8 kg (176 lb)   LMP 06/30/2021   BMI 27.57 kg/m     BMI: Body mass index is 27.57 kg/m .    PHYSICAL EXAM:  Constitutional:   Appearance: Well nourished, well developed, alert, in no acute distress  Neck:  Lymph Nodes:  No lymphadenopathy present    Thyroid:  Gland size normal, nontender, no nodules or masses present  on palpation  Chest:  Respiratory Effort:  Breathing unlabored  Cardiovascular:    Heart: Auscultation:  Regular rate, normal rhythm, no murmurs present  Breasts: NORMAL SKIN W/O LESIONS OR PUCKERING, VERY NODULAR AND FIBROCYSTIC BILATERALLY THROUGHOUT BUT NOTHING OF CONCERN  Gastrointestinal:   Abdominal Examination:  Abdomen nontender to palpation, tone normal without rigidity or guarding, no masses present, umbilicus without lesions   Liver and Spleen:  No hepatomegaly present, liver nontender to palpation    Hernias:  No hernias present  Lymphatic: Lymph Nodes:  No other lymphadenopathy present  Skin:  General Inspection:  No rashes present, no lesions present, no areas of  discoloration  Neurologic:    Mental Status:  Oriented X3.  Normal " strength and tone, sensory exam                grossly normal, mentation intact and speech normal.    Psychiatric:   Mentation appears normal and affect normal/bright.         Pelvic Exam:  External Genitalia:     Normal appearance for age, no discharge present, no tenderness present, no inflammatory lesions present, color normal  Vagina:    Normal vaginal vault without central or paravaginal defects, no discharge present, no inflammatory lesions present, no masses present  Bladder:     Nontender to palpation  Urethra:   Urethral Body:  Urethra palpation normal, urethra structural support normal   Urethral Meatus:  No erythema or lesions present  Cervix:     Appearance healthy, no lesions present, nontender to palpation, no bleeding present, string present  Uterus:     Nontender to palpation, no masses present, position anteflexed, mobility: normal  Adnexa:     No adnexal tenderness present, no adnexal masses present  Perineum:     Perineum within normal limits, no evidence of trauma, no rashes or skin lesions present  Anus:     Anus within normal limits, no hemorrhoids present  Inguinal Lymph Nodes:     No lymphadenopathy present  Pubic Hair:     Normal pubic hair distribution for age  Genitalia and Groin:     No rashes present, no lesions present, no areas of discoloration, no masses present      COUNSELING:   Reviewed preventive health counseling, as reflected in patient instructions  Special attention given to:        COVID VAXX       Regular exercise       Healthy diet/nutrition    BMI: Body mass index is 27.57 kg/m .  Weight management plan: Discussed healthy diet and exercise guidelines    ASSESSMENT:  35 year old female with satisfactory annual exam.    ICD-10-CM    1. Encounter for gynecological examination without abnormal finding  Z01.419    2. BMI 26.0-26.9,adult  Z68.26 phentermine (ADIPEX-P) 15 MG capsule   3. Vitamin D deficiency  E55.9 Vitamin D Deficiency   4. Elevated bilirubin  R17 Hepatic panel    5. Presence of 52 mg levonorgestrel-releasing intrauterine device (IUD)  Z97.5    6. High risk medication use  Z79.899        PLAN:  Pap is UTD for 3 more years  mammo to start in 3 yrs  Will repeat D and hepatic panel today just to make sure both are normal. Discussed that many have to supplement D all year long, and not just in winter, to maintain levels  Happy with her IUD and has 4 more years on it    Discussed her phentermine and timing of the doses. Takes 3 of the 15mg tabs at once so has flexibility if wants to try 15mg later in the day and do 30mg in the morning  Is fine to do it how she is all at once  Stable on it for a long time. Has had some weight gain in the last 6 months but just more sedentary in winter and then improves in summer  Reviewed exercise, healthy eating, etc  Refills sent in for 45mg daily for 6 months and then should follow up with me next January on that    Education provided on covid vaccination, pros/cons, safety, mrna technology and long term data, disspelled myths that are surrounding it  Have encouraged her to get it and she will think about it    On the date of the encounter, an additional 10 minutes was spent on reviewing imaging, lab work, and other provider notes, along with direct management of the patient's medical issues as above.-weight loss and medication mgmt      Karen Ngo MD

## 2021-07-07 ENCOUNTER — OFFICE VISIT (OUTPATIENT)
Dept: OBGYN | Facility: CLINIC | Age: 36
End: 2021-07-07
Payer: COMMERCIAL

## 2021-07-07 VITALS
SYSTOLIC BLOOD PRESSURE: 120 MMHG | BODY MASS INDEX: 27.62 KG/M2 | DIASTOLIC BLOOD PRESSURE: 80 MMHG | WEIGHT: 176 LBS | HEIGHT: 67 IN

## 2021-07-07 DIAGNOSIS — E55.9 VITAMIN D DEFICIENCY: ICD-10-CM

## 2021-07-07 DIAGNOSIS — Z01.419 ENCOUNTER FOR GYNECOLOGICAL EXAMINATION WITHOUT ABNORMAL FINDING: Primary | ICD-10-CM

## 2021-07-07 DIAGNOSIS — R17 ELEVATED BILIRUBIN: ICD-10-CM

## 2021-07-07 DIAGNOSIS — Z79.899 HIGH RISK MEDICATION USE: ICD-10-CM

## 2021-07-07 DIAGNOSIS — Z97.5 PRESENCE OF 52 MG LEVONORGESTREL-RELEASING INTRAUTERINE DEVICE (IUD): ICD-10-CM

## 2021-07-07 LAB
ALBUMIN SERPL-MCNC: 3.8 G/DL (ref 3.4–5)
ALP SERPL-CCNC: 65 U/L (ref 40–150)
ALT SERPL W P-5'-P-CCNC: 22 U/L (ref 0–50)
AST SERPL W P-5'-P-CCNC: 17 U/L (ref 0–45)
BILIRUB DIRECT SERPL-MCNC: 0.2 MG/DL (ref 0–0.2)
BILIRUB SERPL-MCNC: 0.9 MG/DL (ref 0.2–1.3)
DEPRECATED CALCIDIOL+CALCIFEROL SERPL-MC: 23 UG/L (ref 20–75)
PROT SERPL-MCNC: 7.4 G/DL (ref 6.8–8.8)

## 2021-07-07 PROCEDURE — 99212 OFFICE O/P EST SF 10 MIN: CPT | Mod: 25 | Performed by: OBSTETRICS & GYNECOLOGY

## 2021-07-07 PROCEDURE — 36415 COLL VENOUS BLD VENIPUNCTURE: CPT | Performed by: OBSTETRICS & GYNECOLOGY

## 2021-07-07 PROCEDURE — 99395 PREV VISIT EST AGE 18-39: CPT | Performed by: OBSTETRICS & GYNECOLOGY

## 2021-07-07 PROCEDURE — 82306 VITAMIN D 25 HYDROXY: CPT | Performed by: OBSTETRICS & GYNECOLOGY

## 2021-07-07 PROCEDURE — 80076 HEPATIC FUNCTION PANEL: CPT | Performed by: OBSTETRICS & GYNECOLOGY

## 2021-07-07 RX ORDER — PHENTERMINE HYDROCHLORIDE 15 MG/1
45 CAPSULE ORAL EVERY MORNING
Qty: 90 CAPSULE | Refills: 5 | Status: SHIPPED | OUTPATIENT
Start: 2021-07-07 | End: 2021-11-16

## 2021-07-07 SDOH — ECONOMIC STABILITY: TRANSPORTATION INSECURITY
IN THE PAST 12 MONTHS, HAS LACK OF TRANSPORTATION KEPT YOU FROM MEETINGS, WORK, OR FROM GETTING THINGS NEEDED FOR DAILY LIVING?: NOT ASKED

## 2021-07-07 SDOH — ECONOMIC STABILITY: INCOME INSECURITY: HOW HARD IS IT FOR YOU TO PAY FOR THE VERY BASICS LIKE FOOD, HOUSING, MEDICAL CARE, AND HEATING?: NOT ASKED

## 2021-07-07 SDOH — ECONOMIC STABILITY: FOOD INSECURITY: WITHIN THE PAST 12 MONTHS, THE FOOD YOU BOUGHT JUST DIDN'T LAST AND YOU DIDN'T HAVE MONEY TO GET MORE.: NOT ASKED

## 2021-07-07 SDOH — ECONOMIC STABILITY: TRANSPORTATION INSECURITY
IN THE PAST 12 MONTHS, HAS THE LACK OF TRANSPORTATION KEPT YOU FROM MEDICAL APPOINTMENTS OR FROM GETTING MEDICATIONS?: NOT ASKED

## 2021-07-07 SDOH — ECONOMIC STABILITY: FOOD INSECURITY: WITHIN THE PAST 12 MONTHS, YOU WORRIED THAT YOUR FOOD WOULD RUN OUT BEFORE YOU GOT MONEY TO BUY MORE.: NOT ASKED

## 2021-07-07 ASSESSMENT — ANXIETY QUESTIONNAIRES
GAD7 TOTAL SCORE: 0
2. NOT BEING ABLE TO STOP OR CONTROL WORRYING: NOT AT ALL
1. FEELING NERVOUS, ANXIOUS, OR ON EDGE: NOT AT ALL
3. WORRYING TOO MUCH ABOUT DIFFERENT THINGS: NOT AT ALL
7. FEELING AFRAID AS IF SOMETHING AWFUL MIGHT HAPPEN: NOT AT ALL
6. BECOMING EASILY ANNOYED OR IRRITABLE: NOT AT ALL
5. BEING SO RESTLESS THAT IT IS HARD TO SIT STILL: NOT AT ALL

## 2021-07-07 ASSESSMENT — MIFFLIN-ST. JEOR: SCORE: 1515.96

## 2021-07-07 ASSESSMENT — PATIENT HEALTH QUESTIONNAIRE - PHQ9
SUM OF ALL RESPONSES TO PHQ QUESTIONS 1-9: 0
5. POOR APPETITE OR OVEREATING: NOT AT ALL

## 2021-07-08 ASSESSMENT — ANXIETY QUESTIONNAIRES: GAD7 TOTAL SCORE: 0

## 2021-09-25 ENCOUNTER — HEALTH MAINTENANCE LETTER (OUTPATIENT)
Age: 36
End: 2021-09-25

## 2021-11-16 NOTE — PROGRESS NOTES
Faxed received insurance will not cover 15 mg 3 tablets a day-will only cover 2 tablets daily, can we try to send the 30 and 15 mg?

## 2021-11-17 RX ORDER — PHENTERMINE HYDROCHLORIDE 15 MG/1
30 CAPSULE ORAL EVERY MORNING
Qty: 90 CAPSULE | Refills: 0 | Status: SHIPPED | OUTPATIENT
Start: 2021-11-17 | End: 2022-02-16

## 2021-11-17 RX ORDER — PHENTERMINE HYDROCHLORIDE 30 MG/1
30 CAPSULE ORAL EVERY MORNING
Qty: 90 CAPSULE | Refills: 0 | Status: SHIPPED | OUTPATIENT
Start: 2021-11-17 | End: 2022-02-16

## 2022-02-08 NOTE — PROGRESS NOTES
SUBJECTIVE:                                                   Marivel Jones is a 36 year old female who presents to clinic today for the following health issue(s):  Patient presents with:  Recheck Medication: F/u to phentermine 45mg. Feels like it is going well with the dosage.      HPI:  Patient has been doing phentermine for quite a while now and had really significant weight loss with it. Then was unable to get off a plateau despite really trying to be mindful of her eating types and portions. Bumped up to 45mg a few months back to see if could stimulate a bit more metabolic activity.   Has had about 15# of weight gain since last here. Despite that does feel that the increase helped in general with her fatigue and motivation and energy and focus on getting work and household things accomplished so definitely likes that part of it.    Not having any side effects from it. Sleep is fine. Some dry mouth as before. No heart racing or HAs  Was exercising a bit more often in the past than now and was tracking her calories and food types on [employer]. However has slipped on some of those things in the these last few months and knows she needs to get back on track but has been hard with boy in activities and school and work commitments. Is going to recommit to more regular exercise and tracking her food again.    No LMP recorded. (Menstrual status: IUD)..     Patient is sexually active, .  Using IUD for contraception.    reports that she has never smoked. She has never used smokeless tobacco.    STD testing offered?  Declined    Health maintenance updated:  yes    Today's PHQ-2 Score:   PHQ-2 (  [employer]) 2021   Q1: Little interest or pleasure in doing things 0   Q2: Feeling down, depressed or hopeless 0   PHQ-2 Score 0   PHQ-2 Total Score (12-17 Years)- Positive if 3 or more points; Administer PHQ-A if positive 0     Today's PHQ-9 Score:   PHQ-9 SCORE 2021   PHQ-9 Total Score 0  "    Today's NATACHA-7 Score:   NATACHA-7 SCORE 7/7/2021   Total Score 0       Problem list and histories reviewed & adjusted, as indicated.  Additional history: as documented.    Patient Active Problem List   Diagnosis     Vitamin D deficiency     Subclinical hypothyroidism     IUD contraception     Past Surgical History:   Procedure Laterality Date     wisdom teeth  2012      Social History     Tobacco Use     Smoking status: Never Smoker     Smokeless tobacco: Never Used   Substance Use Topics     Alcohol use: Yes     Alcohol/week: 0.0 standard drinks     Comment: Occas       Problem (# of Occurrences) Relation (Name,Age of Onset)    No Known Problems (7) Mother, Father, Sister, Brother, Maternal Grandfather, Paternal Grandmother, Other    Other Cancer (1) Maternal Grandmother    Thyroid Disease (1) Maternal Grandmother            Current Outpatient Medications   Medication Sig     levonorgestrel (MIRENA) 20 MCG/24HR IUD 1 each by Intrauterine route once     lisdexamfetamine (VYVANSE) 40 MG capsule Take 1 capsule (40 mg) by mouth every morning     No current facility-administered medications for this visit.     No Known Allergies    ROS:  12 point review of systems negative other than symptoms noted below or in the HPI.  No urinary frequency or dysuria, bladder or kidney problems      OBJECTIVE:     /70   Ht 1.702 m (5' 7\")   Wt 85.8 kg (189 lb 3.2 oz)   Breastfeeding No   BMI 29.63 kg/m    Body mass index is 29.63 kg/m .    Exam:  Constitutional:  Appearance: Well nourished, well developed alert, in no acute distress  Chest:  Respiratory Effort:  Breathing unlabored. Clear to auscultation bilaterally.   Cardiovascular: Heart: Auscultation:  Regular rate, normal rhythm, no murmurs present     In-Clinic Test Results:  No results found for this or any previous visit (from the past 24 hour(s)).    ASSESSMENT/PLAN:                                                        ICD-10-CM    1. Binge eating  R63.2 " lisdexamfetamine (VYVANSE) 40 MG capsule   2. Overweight (BMI 25.0-29.9)  E66.3          Has had a lot of overall success on phentermine over the last couple of years and has still lost and kept off a lot of her initial weight loss but has definitely gained in the last several months  Increase in phentermine has been good in terms of energy and motivation but has not been as helpful in terms of basal metabolic rate, later in the afternoon/evening appetite suppression, etc  Discussed staying on phentermine at 45mg, which is off label usage especially in the long term but her BP and heart rate are fine and no side effects, or consider change to vyvanse for more binge, impulsive, mindless eating that she has been doing particularly in the afternoons/evenings when the phentermine is waning in effect  Discussed vyvanse and the indications, the pros/cons/side effects. Dosing of it in setting of other recent stimulant use and transition typically does entail higher doses but since new med, starting at a medium dose to avoid any untoward effects.  Discussed cost, insurance,  coupon    Patient would like to try vyvanse if cost effective and see if that can help give her a boost in motivation to return to exercising and food choice/meal planning  Encouraged 30 min 5x/week of exercise, low carb/sugar/processed foods and more protein/fruits/veggies and just general meal planning in advance to help avoid the typical grab/go foods when running with kids activities    F/u in one month to review effect/dose adjust/etc    On the date of the encounter, 22 minutes was spent on reviewing imaging, lab work, and other provider notes, along with direct management of the patient's medical issues as above.      Karen Ngo MD  [facility]

## 2022-02-09 ENCOUNTER — OFFICE VISIT (OUTPATIENT)
Dept: OBGYN | Facility: CLINIC | Age: 37
End: 2022-02-09
Payer: COMMERCIAL

## 2022-02-09 VITALS
SYSTOLIC BLOOD PRESSURE: 120 MMHG | HEIGHT: 67 IN | DIASTOLIC BLOOD PRESSURE: 70 MMHG | BODY MASS INDEX: 29.7 KG/M2 | WEIGHT: 189.2 LBS

## 2022-02-09 DIAGNOSIS — R63.2 BINGE EATING: Primary | ICD-10-CM

## 2022-02-09 DIAGNOSIS — E66.3 OVERWEIGHT (BMI 25.0-29.9): ICD-10-CM

## 2022-02-09 PROCEDURE — 99213 OFFICE O/P EST LOW 20 MIN: CPT | Performed by: OBSTETRICS & GYNECOLOGY

## 2022-02-09 RX ORDER — LISDEXAMFETAMINE DIMESYLATE 40 MG/1
40 CAPSULE ORAL EVERY MORNING
Qty: 30 CAPSULE | Refills: 0 | Status: SHIPPED | OUTPATIENT
Start: 2022-02-09 | End: 2022-04-13

## 2022-02-09 ASSESSMENT — MIFFLIN-ST. JEOR: SCORE: 1570.84

## 2022-02-16 ENCOUNTER — TELEPHONE (OUTPATIENT)
Dept: OBGYN | Facility: CLINIC | Age: 37
End: 2022-02-16

## 2022-02-16 NOTE — TELEPHONE ENCOUNTER
Pt is calling in about lisdexamfetamine. Per pt, pharmacy is not able to fill the Rx, and pt was directed to call the clinic. Please advise

## 2022-02-17 NOTE — TELEPHONE ENCOUNTER
Contacted pharmacy. PA needed. Will start process today.  Pt informed.   Sol Carpenter RN on 2/17/2022 at 9:11 AM

## 2022-02-18 NOTE — TELEPHONE ENCOUNTER
Prior Authorization Retail Medication Request    Medication/Dose: lisdexamfetamine (VYVANSE) 40 MG capsule  ICD code: Binge eating [R63.2]  - Primary   Previously Tried and Failed:    Rationale:      Insurance Name:  [employer]  Insurance ID:  [id number]

## 2022-02-23 NOTE — TELEPHONE ENCOUNTER
Central Prior Authorization Team   Phone: [phone]    PA Initiation    Medication: lisdexamfetamine (VYVANSE) 40 MG capsule  Insurance Company: [employer] - Phone [phone] Fax [phone]  Pharmacy Filling the Rx: [id number] - [place], [place] - [address] [address]  Filling Pharmacy Phone: [phone]  Filling Pharmacy Fax:    Start Date: 2/23/2022

## 2022-02-23 NOTE — TELEPHONE ENCOUNTER
Prior Authorization Approval    Authorization Effective Date: 1/24/2022  Authorization Expiration Date: 2/23/2023  Medication: lisdexamfetamine (VYVANSE) 40 MG capsule  Approved Dose/Quantity:    Reference #:     Insurance Company: [employer] - Phone [phone] Fax [phone]  Expected CoPay:       CoPay Card Available:      Foundation Assistance Needed:    Which Pharmacy is filling the prescription (Not needed for infusion/clinic administered): [facility] [id number] [place] [address]  Pharmacy Notified: Yes  Patient Notified: Yes  **Instructed pharmacy to notify patient when script is ready to /ship.**

## 2022-04-12 NOTE — PROGRESS NOTES
Marivel Jones is a 36 year old female who is being evaluated via a billable telephone visit.      What phone number would you like to be contacted at? [phone]  How would you like to obtain your AVS? Christie    SUBJECTIVE:                                                   Marivel Jones is a 36 year old female who presents for virtual visit today for the following health issue(s):  Patient presents with:  Recheck Medication: 4 week Vyvanse follow up. Switched from Phentermine 45mg to Vyvanse 40mg due to waning effect of Phentermine in the afternoon. Pt wondering if dose needs to be increased as hasn't felt any appetite suppression or energy increase with this.      HPI:  Patient had been on phentermine several times over the last few years and had great weight loss with it initially. Went back on it after her youngest was born and again had good weight loss but then hit a plateau and got unmotivated to do the eating/journaling and exercise she had been and gained back about 15# of what she'd lost. Bumped to 45mg and that continued to help with energy and motivation and chronic fatigue but wasn't losing despite trying to do some of the dietary and exercise changes.    Decided to try vyvanse as the biggest issue was phentermine wasn't helping in the evenings and then finding she would binge eat and mindless and impulsive eat more and unhealthy foods, etc.   Switched from 45mg phent to 40mg vyvanse 2 months ago and this is a f/u to that    Feels that she hasn't had any impacts with vyvanse at all. No side effects but no appetite suppression, not really feeling any benefit on focus, motivation, energy, and not helping with the impulsive overeating at night. Admits she hasn't done any changing of her diet or increasing her exercise either but was hoping the med would give her the motivation and drive to get back to that and didn't find it helping at all so hasn't.  Weight is still about 3# down  from when she was in office 2 months ago compared to home scale today and adjusting about 3# higher on our scale. So hasn't gained but feeling no effect at all from the vyvanse    No LMP recorded. (Menstrual status: IUD).    Patient is sexually active, .  Using IUD for contraception.    reports that she has never smoked. She has never used smokeless tobacco.    Health maintenance updated:  yes    Today's PHQ-2 Score:   PHQ-2 (  [employer]) 2021   Q1: Little interest or pleasure in doing things 0   Q2: Feeling down, depressed or hopeless 0   PHQ-2 Score 0   PHQ-2 Total Score (12-17 Years)- Positive if 3 or more points; Administer PHQ-A if positive 0     Today's PHQ-9 Score:   PHQ-9 SCORE 2021   PHQ-9 Total Score 0     Today's NATACHA-7 Score:   NATACHA-7 SCORE 2021   Total Score 0       Problem list and histories reviewed & adjusted, as indicated.  Additional history: as documented.    Patient Active Problem List   Diagnosis     Vitamin D deficiency     Subclinical hypothyroidism     IUD contraception     Past Surgical History:   Procedure Laterality Date     wisdom teeth  2012      Social History     Tobacco Use     Smoking status: Never Smoker     Smokeless tobacco: Never Used   Substance Use Topics     Alcohol use: Yes     Alcohol/week: 0.0 standard drinks     Comment: Occas       Problem (# of Occurrences) Relation (Name,Age of Onset)    No Known Problems (7) Mother, Father, Sister, Brother, Maternal Grandfather, Paternal Grandmother, Other    Other Cancer (1) Maternal Grandmother    Thyroid Disease (1) Maternal Grandmother            Current Outpatient Medications   Medication Sig     levonorgestrel (MIRENA) 20 MCG/24HR IUD 1 each by Intrauterine route once     lisdexamfetamine (VYVANSE) 70 MG capsule Take 1 capsule (70 mg) by mouth every morning     No current facility-administered medications for this visit.     No Known Allergies      OBJECTIVE:     No vitals were obtained today due to virtual  visit.    Physical Exam    PSYCH: Mentation appears normal, affect normal/bright, judgement and insight intact, normal speech       ASSESSMENT/PLAN:                                                      Phone call duration: 20 minutes and 3 minutes of chart review on the same date of service      ICD-10-CM    1. Binge eating  R63.2 lisdexamfetamine (VYVANSE) 70 MG capsule         Patient has had stable to very slight weight loss since here despite not feeling any benefits of the vyvanse, nor side effects.   Has no HAs, insomnia, heart palps and her BPs are normal on it  Very common that after long term stimulant use that she would need a higher dose of a different stimulant to see matched or improved effect so this isn't unexpected.  Discussed slower more incremental increase from 40mg vs larger jump to 70mg. In my experience doses less than 60-70mg after long term phentermine, espeically 45mg phent vs 30mg, are not effective for most  Reviewed pros/cons/side effects of trial of higher dose and will do 70mg x1 month and then repeat phone visit f/up with BP and weight at home prior to appointment and patient is agreeable to this    Karen Ngo MD  [facility]

## 2022-04-13 ENCOUNTER — VIRTUAL VISIT (OUTPATIENT)
Dept: OBGYN | Facility: CLINIC | Age: 37
End: 2022-04-13
Payer: COMMERCIAL

## 2022-04-13 VITALS — HEIGHT: 67 IN | BODY MASS INDEX: 28.72 KG/M2 | WEIGHT: 183 LBS

## 2022-04-13 DIAGNOSIS — R63.2 BINGE EATING: Primary | ICD-10-CM

## 2022-04-13 PROCEDURE — 99213 OFFICE O/P EST LOW 20 MIN: CPT | Mod: TEL | Performed by: OBSTETRICS & GYNECOLOGY

## 2022-04-13 RX ORDER — LISDEXAMFETAMINE DIMESYLATE 70 MG/1
70 CAPSULE ORAL EVERY MORNING
Qty: 30 CAPSULE | Refills: 0 | Status: SHIPPED | OUTPATIENT
Start: 2022-04-13 | End: 2022-05-17

## 2022-05-11 NOTE — PROGRESS NOTES
"Marivel Jones is a 36 year old female who is being evaluated via a billable telephone visit.      What phone number would you like to be contacted at? [phone]  How would you like to obtain your AVS? Chrisite      SUBJECTIVE:                                                   Marivel Jones is a 36 year old female who presents for virtual visit today for the following health issue(s):  Patient presents with:  Medication Follow-up: Patient is now on 70mg Vyvanse.    Additional information: patient reported weight 181 and she does not take her BP at home.    HPI:  Bumped from 40mg vyvanse all the way up to 70mg one month ago. Discussed doing other doses in between but really felt that the 40mg wasn't effective after so long on phentermine and 45mg phentermine most recently.  Did have some HAs when initially n the 40mg so did caution about the fast bump  Patient has now done the 70mg w/o any issue. Definitely much less hungry and much more appetite suppression on this dose  Has not had any HAs at all since the bump. No insomnia, palpitations, anxiety or negative effects  Feels like her energy is not much better but also \"getting older\"    Has only lost 2# in the month. Both on her home scale. 181# today and 183# last time  However reports she really hasn't exercised at all. Really committed to it regularly early in covid in 2020 and that's when most of her weight loss occurred and then started to plateau and the exercise fell off. Also with covid restrictions lessening was back to running kids to school and  and sports and activities and just not having any time  Yoel does coli's  drop off. She does some pick ups. The boys are in school all day. She is working from home. Knows she has morning time to do it but doesn't  Also admits that food choices have definitely not been great. Much more late eating b/c hasn't been hungry all day but then also running with kids so the grab and " go pantry items or fastfood are more than she knows she should be  Was doing an online taylor dance exercise. So access to it any time and not restricted by certain class times or gym access    No LMP recorded. (Menstrual status: IUD)..   Patient is sexually active, .  Using IUD for contraception.    reports that she has never smoked. She has never used smokeless tobacco.  Health maintenance updated:  yes    Today's PHQ-2 Score:   PHQ-2 (  [employer]) 2021   Q1: Little interest or pleasure in doing things 0   Q2: Feeling down, depressed or hopeless 0   PHQ-2 Score 0   PHQ-2 Total Score (12-17 Years)- Positive if 3 or more points; Administer PHQ-A if positive 0     Today's PHQ-9 Score:   PHQ-9 SCORE 2021   PHQ-9 Total Score 0     Today's NATACHA-7 Score:   NATACHA-7 SCORE 2021   Total Score 0       Problem list and histories reviewed & adjusted, as indicated.  Additional history: as documented.    Patient Active Problem List   Diagnosis     Vitamin D deficiency     Subclinical hypothyroidism     IUD contraception     Past Surgical History:   Procedure Laterality Date     wisdom teeth  2012      Social History     Tobacco Use     Smoking status: Never Smoker     Smokeless tobacco: Never Used   Substance Use Topics     Alcohol use: Yes     Alcohol/week: 0.0 standard drinks     Comment: Occas       Problem (# of Occurrences) Relation (Name,Age of Onset)    No Known Problems (7) Mother, Father, Sister, Brother, Maternal Grandfather, Paternal Grandmother, Other    Other Cancer (1) Maternal Grandmother    Thyroid Disease (1) Maternal Grandmother            Current Outpatient Medications   Medication Sig     levonorgestrel (MIRENA) 20 MCG/24HR IUD 1 each by Intrauterine route once     lisdexamfetamine (VYVANSE) 70 MG capsule Take 1 capsule (70 mg) by mouth daily     [START ON 2022] lisdexamfetamine (VYVANSE) 70 MG capsule Take 1 capsule (70 mg) by mouth daily     [START ON 2022] lisdexamfetamine (VYVANSE)  70 MG capsule Take 1 capsule (70 mg) by mouth daily     No current facility-administered medications for this visit.     No Known Allergies      OBJECTIVE:     No vitals were obtained today due to virtual visit.    Physical Exam    PSYCH: Mentation appears normal, affect normal/bright, judgement and insight intact, normal speech           ASSESSMENT/PLAN:                                                      Phone call duration: 20 minutes with 2 minutes of chart review on the same date of service      ICD-10-CM    1. Binge eating  R63.2 lisdexamfetamine (VYVANSE) 70 MG capsule     lisdexamfetamine (VYVANSE) 70 MG capsule     lisdexamfetamine (VYVANSE) 70 MG capsule   2. Overweight with body mass index (BMI) of 28 to 28.9 in adult  E66.3     Z68.28          Patient is tolerating the increased vyvanse and has had no side effects. Has not checked a BP since the increase but has not had elevated BP in the past and no CV side effects.  However weight loss has been minimal and admits she is not doing the things herself that she knows she needs to and has done in the past.    At this point will continue on vyvanse at 70mg as tolerating it and 3 months sent in and then will do a phone visit or in person f/u at that point.    Discussed setting 1-2 attainable goals before next visit  1)scheduling on a calendar for 3x/week AM exercise so blocked out from work and family commitments needs and look at it like an appointment she can't not keep. Start with 3x/week of 30 min and slowly work up to 5x/wee    2)consider looking at an online health /accountability  that would work with her weekly/daily to meal plan, help her stay committed to exercise/etc    3)work on changing one food type that isn't healthy to a planned food type that is    Will work on these things and then f/u in 3 months    Karen Ngo MD  [facility] [place]

## 2022-05-17 ENCOUNTER — VIRTUAL VISIT (OUTPATIENT)
Dept: OBGYN | Facility: CLINIC | Age: 37
End: 2022-05-17
Payer: COMMERCIAL

## 2022-05-17 VITALS — WEIGHT: 181 LBS | HEIGHT: 67 IN | BODY MASS INDEX: 28.41 KG/M2

## 2022-05-17 DIAGNOSIS — R63.2 BINGE EATING: Primary | ICD-10-CM

## 2022-05-17 DIAGNOSIS — E66.3 OVERWEIGHT WITH BODY MASS INDEX (BMI) OF 28 TO 28.9 IN ADULT: ICD-10-CM

## 2022-05-17 PROCEDURE — 99213 OFFICE O/P EST LOW 20 MIN: CPT | Mod: 95 | Performed by: OBSTETRICS & GYNECOLOGY

## 2022-05-17 RX ORDER — LISDEXAMFETAMINE DIMESYLATE 70 MG/1
70 CAPSULE ORAL DAILY
Qty: 30 CAPSULE | Refills: 0 | Status: SHIPPED | OUTPATIENT
Start: 2022-07-18 | End: 2022-08-17

## 2022-05-17 RX ORDER — LISDEXAMFETAMINE DIMESYLATE 70 MG/1
70 CAPSULE ORAL DAILY
Qty: 30 CAPSULE | Refills: 0 | Status: SHIPPED | OUTPATIENT
Start: 2022-05-17 | End: 2022-06-16

## 2022-05-17 RX ORDER — LISDEXAMFETAMINE DIMESYLATE 70 MG/1
70 CAPSULE ORAL DAILY
Qty: 30 CAPSULE | Refills: 0 | Status: SHIPPED | OUTPATIENT
Start: 2022-06-17 | End: 2022-07-17

## 2022-06-22 ENCOUNTER — APPOINTMENT (OUTPATIENT)
Dept: URBAN - METROPOLITAN AREA CLINIC 259 | Age: 37
Setting detail: DERMATOLOGY
End: 2022-06-23

## 2022-06-22 VITALS — HEIGHT: 66 IN | WEIGHT: 185 LBS

## 2022-06-22 DIAGNOSIS — Z71.89 OTHER SPECIFIED COUNSELING: ICD-10-CM

## 2022-06-22 DIAGNOSIS — L73.8 OTHER SPECIFIED FOLLICULAR DISORDERS: ICD-10-CM

## 2022-06-22 DIAGNOSIS — L70.0 ACNE VULGARIS: ICD-10-CM

## 2022-06-22 DIAGNOSIS — D22 MELANOCYTIC NEVI: ICD-10-CM

## 2022-06-22 PROBLEM — D22.5 MELANOCYTIC NEVI OF TRUNK: Status: ACTIVE | Noted: 2022-06-22

## 2022-06-22 PROBLEM — D23.71 OTHER BENIGN NEOPLASM OF SKIN OF RIGHT LOWER LIMB, INCLUDING HIP: Status: ACTIVE | Noted: 2022-06-22

## 2022-06-22 PROCEDURE — OTHER MIPS QUALITY: OTHER

## 2022-06-22 PROCEDURE — OTHER PRESCRIPTION: OTHER

## 2022-06-22 PROCEDURE — OTHER COUNSELING: OTHER

## 2022-06-22 PROCEDURE — 99203 OFFICE O/P NEW LOW 30 MIN: CPT

## 2022-06-22 RX ORDER — TRETIONIN 0.25 MG/G
CREAM TOPICAL QHS
Qty: 20 | Refills: 2 | Status: ERX | COMMUNITY
Start: 2022-06-22

## 2022-06-22 ASSESSMENT — LOCATION DETAILED DESCRIPTION DERM
LOCATION DETAILED: RIGHT MEDIAL FOREHEAD
LOCATION DETAILED: LEFT INFERIOR CENTRAL MALAR CHEEK
LOCATION DETAILED: RIGHT SUPERIOR UPPER BACK

## 2022-06-22 ASSESSMENT — LOCATION ZONE DERM
LOCATION ZONE: TRUNK
LOCATION ZONE: FACE

## 2022-06-22 ASSESSMENT — LOCATION SIMPLE DESCRIPTION DERM
LOCATION SIMPLE: LEFT CHEEK
LOCATION SIMPLE: RIGHT UPPER BACK
LOCATION SIMPLE: RIGHT FOREHEAD

## 2022-06-22 NOTE — HPI: FULL BODY SKIN EXAMINATION
What Type Of Note Output Would You Prefer (Optional)?: Standard Output
What Is The Reason For Today's Visit?: Annual Full Body Skin Examination with No Concerns
What Is The Reason For Today's Visit? (Being Monitored For X): concerning skin lesions on an annual basis
Additional History: Patient reports no self history or family history of skin cancer. Patient has had a full body skin exam before. Patient has a spot of concern on her forehead that she would like specifically evaluated. The spot doesn’t heal.

## 2022-06-22 NOTE — PROCEDURE: COUNSELING
Spironolactone Counseling: Patient advised regarding risks of diarrhea, abdominal pain, hyperkalemia, birth defects (for female patients), liver toxicity and renal toxicity. The patient may need blood work to monitor liver and kidney function and potassium levels while on therapy. The patient verbalized understanding of the proper use and possible adverse effects of spironolactone.  All of the patient's questions and concerns were addressed.
Topical Retinoid counseling:  Patient advised to apply a pea-sized amount only at bedtime and wait 30 minutes after washing their face before applying.  If too drying, patient may add a non-comedogenic moisturizer. The patient verbalized understanding of the proper use and possible adverse effects of retinoids.  All of the patient's questions and concerns were addressed.
Include Pregnancy/Lactation Warning?: No
Tetracycline Pregnancy And Lactation Text: This medication is Pregnancy Category D and not consider safe during pregnancy. It is also excreted in breast milk.
Winlevi Counseling:  I discussed with the patient the risks of topical clascoterone including but not limited to erythema, scaling, itching, and stinging. Patient voiced their understanding.
Doxycycline Counseling:  Patient counseled regarding possible photosensitivity and increased risk for sunburn.  Patient instructed to avoid sunlight, if possible.  When exposed to sunlight, patients should wear protective clothing, sunglasses, and sunscreen.  The patient was instructed to call the office immediately if the following severe adverse effects occur:  hearing changes, easy bruising/bleeding, severe headache, or vision changes.  The patient verbalized understanding of the proper use and possible adverse effects of doxycycline.  All of the patient's questions and concerns were addressed.
Minocycline Counseling: Patient advised regarding possible photosensitivity and discoloration of the teeth, skin, lips, tongue and gums.  Patient instructed to avoid sunlight, if possible.  When exposed to sunlight, patients should wear protective clothing, sunglasses, and sunscreen.  The patient was instructed to call the office immediately if the following severe adverse effects occur:  hearing changes, easy bruising/bleeding, severe headache, or vision changes.  The patient verbalized understanding of the proper use and possible adverse effects of minocycline.  All of the patient's questions and concerns were addressed.
Spironolactone Pregnancy And Lactation Text: This medication can cause feminization of the male fetus and should be avoided during pregnancy. The active metabolite is also found in breast milk.
Winlevi Pregnancy And Lactation Text: This medication is considered safe during pregnancy and breastfeeding.
Tazorac Pregnancy And Lactation Text: This medication is not safe during pregnancy. It is unknown if this medication is excreted in breast milk.
Azelaic Acid Counseling: Patient counseled that medicine may cause skin irritation and to avoid applying near the eyes.  In the event of skin irritation, the patient was advised to reduce the amount of the drug applied or use it less frequently.   The patient verbalized understanding of the proper use and possible adverse effects of azelaic acid.  All of the patient's questions and concerns were addressed.
Birth Control Pills Pregnancy And Lactation Text: This medication should be avoided if pregnant and for the first 30 days post-partum.
Bactrim Pregnancy And Lactation Text: This medication is Pregnancy Category D and is known to cause fetal risk.  It is also excreted in breast milk.
Topical Clindamycin Pregnancy And Lactation Text: This medication is Pregnancy Category B and is considered safe during pregnancy. It is unknown if it is excreted in breast milk.
Sarecycline Counseling: Patient advised regarding possible photosensitivity and discoloration of the teeth, skin, lips, tongue and gums.  Patient instructed to avoid sunlight, if possible.  When exposed to sunlight, patients should wear protective clothing, sunglasses, and sunscreen.  The patient was instructed to call the office immediately if the following severe adverse effects occur:  hearing changes, easy bruising/bleeding, severe headache, or vision changes.  The patient verbalized understanding of the proper use and possible adverse effects of sarecycline.  All of the patient's questions and concerns were addressed.
Topical Clindamycin Counseling: Patient counseled that this medication may cause skin irritation or allergic reactions.  In the event of skin irritation, the patient was advised to reduce the amount of the drug applied or use it less frequently.   The patient verbalized understanding of the proper use and possible adverse effects of clindamycin.  All of the patient's questions and concerns were addressed.
Topical Retinoid Pregnancy And Lactation Text: This medication is Pregnancy Category C. It is unknown if this medication is excreted in breast milk.
Benzoyl Peroxide Counseling: Patient counseled that medicine may cause skin irritation and bleach clothing.  In the event of skin irritation, the patient was advised to reduce the amount of the drug applied or use it less frequently.   The patient verbalized understanding of the proper use and possible adverse effects of benzoyl peroxide.  All of the patient's questions and concerns were addressed.
High Dose Vitamin A Pregnancy And Lactation Text: High dose vitamin A therapy is contraindicated during pregnancy and breast feeding.
Isotretinoin Counseling: Patient should get monthly blood tests, not donate blood, not drive at night if vision affected, not share medication, and not undergo elective surgery for 6 months after tx completed. Side effects reviewed, pt to contact office should one occur.
Erythromycin Pregnancy And Lactation Text: This medication is Pregnancy Category B and is considered safe during pregnancy. It is also excreted in breast milk.
Benzoyl Peroxide Pregnancy And Lactation Text: This medication is Pregnancy Category C. It is unknown if benzoyl peroxide is excreted in breast milk.
Topical Sulfur Applications Pregnancy And Lactation Text: This medication is Pregnancy Category C and has an unknown safety profile during pregnancy. It is unknown if this topical medication is excreted in breast milk.
Tetracycline Counseling: Patient counseled regarding possible photosensitivity and increased risk for sunburn.  Patient instructed to avoid sunlight, if possible.  When exposed to sunlight, patients should wear protective clothing, sunglasses, and sunscreen.  The patient was instructed to call the office immediately if the following severe adverse effects occur:  hearing changes, easy bruising/bleeding, severe headache, or vision changes.  The patient verbalized understanding of the proper use and possible adverse effects of tetracycline.  All of the patient's questions and concerns were addressed. Patient understands to avoid pregnancy while on therapy due to potential birth defects.
Detail Level: Zone
Azithromycin Pregnancy And Lactation Text: This medication is considered safe during pregnancy and is also secreted in breast milk.
Isotretinoin Pregnancy And Lactation Text: This medication is Pregnancy Category X and is considered extremely dangerous during pregnancy. It is unknown if it is excreted in breast milk.
Doxycycline Pregnancy And Lactation Text: This medication is Pregnancy Category D and not consider safe during pregnancy. It is also excreted in breast milk but is considered safe for shorter treatment courses.
Topical Sulfur Applications Counseling: Topical Sulfur Counseling: Patient counseled that this medication may cause skin irritation or allergic reactions.  In the event of skin irritation, the patient was advised to reduce the amount of the drug applied or use it less frequently.   The patient verbalized understanding of the proper use and possible adverse effects of topical sulfur application.  All of the patient's questions and concerns were addressed.
Tazorac Counseling:  Patient advised that medication is irritating and drying.  Patient may need to apply sparingly and wash off after an hour before eventually leaving it on overnight.  The patient verbalized understanding of the proper use and possible adverse effects of tazorac.  All of the patient's questions and concerns were addressed.
Dapsone Counseling: I discussed with the patient the risks of dapsone including but not limited to hemolytic anemia, agranulocytosis, rashes, methemoglobinemia, kidney failure, peripheral neuropathy, headaches, GI upset, and liver toxicity.  Patients who start dapsone require monitoring including baseline LFTs and weekly CBCs for the first month, then every month thereafter.  The patient verbalized understanding of the proper use and possible adverse effects of dapsone.  All of the patient's questions and concerns were addressed.
Detail Level: Generalized
Aklief Pregnancy And Lactation Text: It is unknown if this medication is safe to use during pregnancy.  It is unknown if this medication is excreted in breast milk.  Breastfeeding women should use the topical cream on the smallest area of the skin for the shortest time needed while breastfeeding.  Do not apply to nipple and areola.
Bactrim Counseling:  I discussed with the patient the risks of sulfa antibiotics including but not limited to GI upset, allergic reaction, drug rash, diarrhea, dizziness, photosensitivity, and yeast infections.  Rarely, more serious reactions can occur including but not limited to aplastic anemia, agranulocytosis, methemoglobinemia, blood dyscrasias, liver or kidney failure, lung infiltrates or desquamative/blistering drug rashes.
Detail Level: Detailed
Erythromycin Counseling:  I discussed with the patient the risks of erythromycin including but not limited to GI upset, allergic reaction, drug rash, diarrhea, increase in liver enzymes, and yeast infections.
Azelaic Acid Pregnancy And Lactation Text: This medication is considered safe during pregnancy and breast feeding.
Dapsone Pregnancy And Lactation Text: This medication is Pregnancy Category C and is not considered safe during pregnancy or breast feeding.
Aklief counseling:  Patient advised to apply a pea-sized amount only at bedtime and wait 30 minutes after washing their face before applying.  If too drying, patient may add a non-comedogenic moisturizer.  The most commonly reported side effects including irritation, redness, scaling, dryness, stinging, burning, itching, and increased risk of sunburn.  The patient verbalized understanding of the proper use and possible adverse effects of retinoids.  All of the patient's questions and concerns were addressed.
Birth Control Pills Counseling: Birth Control Pill Counseling: I discussed with the patient the potential side effects of OCPs including but not limited to increased risk of stroke, heart attack, thrombophlebitis, deep venous thrombosis, hepatic adenomas, breast changes, GI upset, headaches, and depression.  The patient verbalized understanding of the proper use and possible adverse effects of OCPs. All of the patient's questions and concerns were addressed.
High Dose Vitamin A Counseling: Side effects reviewed, pt to contact office should one occur.
Azithromycin Counseling:  I discussed with the patient the risks of azithromycin including but not limited to GI upset, allergic reaction, drug rash, diarrhea, and yeast infections.

## 2022-08-27 ENCOUNTER — HEALTH MAINTENANCE LETTER (OUTPATIENT)
Age: 37
End: 2022-08-27

## 2022-12-26 ENCOUNTER — HEALTH MAINTENANCE LETTER (OUTPATIENT)
Age: 37
End: 2022-12-26

## 2023-04-26 NOTE — PROGRESS NOTES
"Marivel is a 37 year old  female who presents for annual exam.     Besides routine health maintenance, she has no other health concerns today.    HPI:  The patient's PCP is Dr. Karen Ngo MD.      Patient states it's \"been a rough year\"  She was last doing vyvanse for both binge eating and fatigue and weight. Had been on phent for years and eventually plateau'd and even did 45mg at one point. On vyvanse 40mg was really not feeling anything. Bumped to 70mg  and at f/up one month later felt it wasn't doing much for weight or appetite suppression/snacking but did report she was more awake and on track and focused. Was going to continue to 3 months then f/up but never did  Now has gained back all the weight she lost. Thinks about weight every day of her life. Mad at herself that she resolved to never get back to this weight and it regained so fast, and then some, that it affects her mood and marriage and overall happiness and is the primary thing that does this b/c overall happy and good job, marriage happy, kids are good, etc    Has had her mirena since . Gets very random, short lived, light spotting. Not more than when wiping and not needing a tampon or even a liner. No cramping or other issues. Feels like always has nightsweats and has for years. Before and between kids, since IUD and before it. No rhythm or pattern. Thinks she had them even when lost weight but admittedly is just hotter in general when weighs more so does think that could be part of it    Feels like has almost an ADD. Starts tasks and doesn't finish, no energy or motivation. Feels like she has word search issues and forgetfullness and trouble focusing. Denies anxiety or depression per se but as above weight and frustration with not feeling as \"sharp\" as she once did is the biggest issue    Has had elevated bili in the past but then normalized on repeat. Has had very low Vit D in the past. Last labs  and normal other than that. " Would like to repeat today.    Due for a mammo end of July when turns 38    GYNECOLOGIC HISTORY:    No LMP recorded. (Menstrual status: IUD). (Mirena placed 2019).    Regular menses? No, intermittent spotting.    Her current contraception method is: IUD.  She  reports that she has never smoked. She has never used smokeless tobacco.    Patient is sexually active.  STD testing offered?  Declined     Last PHQ-9 score on record =       2023     8:34 AM   PHQ-9 SCORE   PHQ-9 Total Score 0     Last GAD7 score on record =       2023     8:34 AM   NATACHA-7 SCORE   Total Score 0     Alcohol Score = 2    HEALTH MAINTENANCE:  Cholesterol:   Recent Labs   Lab Test 20  1618 06/19/15  0718   CHOL 138 159   HDL 56 56   LDL 71 90   TRIG 56 66   CHOLHDLRATIO  --  2.8     Last Mammo: Not applicable, Next Mammo: Due at age 38   Pap:  Lab Results   Component Value Date    PAP NIL, HPV NEG 2019     Colonoscopy: NA, Next Colonoscopy: 43 years.  Dexa:  NA    Health maintenance updated: yes    HISTORY:  OB History    Para Term  AB Living   3 3 3 0 0 3   SAB IAB Ectopic Multiple Live Births   0 0 0 0 3      # Outcome Date GA Lbr Efren/2nd Weight Sex Delivery Anes PTL Lv   3 Term 19 37w0d 04:20 / 00:18 2.985 kg (6 lb 9.3 oz) F Vag-Spont EPI N CHANDNI      Birth Comments: followed and delivered by [facility]. PROM with pit aug, pushed one time, 1st degree lac repaired      Name: Daxa      Apgar1: 9  Apgar5: 9   2 Term /13 39w3d 02:20 / 01:00 4.04 kg (8 lb 14.5 oz) M Vag-Spont EPI  CHANDNI      Birth Comments: followed and delivered by [facility]. elective indux. presume macrosomia and advanced cvx dilation. done at 39+3. labor was 2 hr 20min but actively pushed for an hour      Name: Edi      Apgar1: 9  Apgar5: 9   1 Term 11 39w0d  3.827 kg (8 lb 7 oz) M Vag-Vacuum EPI  CHANDNI      Birth Comments: Followed and delivered by [facility]. Elective indux with favorable cvx and presumed macrosomia. Vac done for  "exhaustion and arrest @+2. 2 pulls and no popoffs, long second stage over 4 hrs      Name: Pina       Patient Active Problem List   Diagnosis     Vitamin D deficiency     Presence of intrauterine contraceptive device     Subclinical hypothyroidism     Presence of 52 mg levonorgestrel-releasing intrauterine device (IUD)     Elevated bilirubin     Night sweat     Past Surgical History:   Procedure Laterality Date     wisdom teeth  2012      Social History     Tobacco Use     Smoking status: Never     Smokeless tobacco: Never   Vaping Use     Vaping status: Not on file   Substance Use Topics     Alcohol use: Yes     Alcohol/week: 0.0 standard drinks of alcohol     Comment: Occas       Problem (# of Occurrences) Relation (Name,Age of Onset)    Thyroid Disease (1) Maternal Grandmother    Other Cancer (1) Maternal Grandmother    No Known Problems (7) Mother, Father, Sister, Brother, Maternal Grandfather, Paternal Grandmother, Other            Current Outpatient Medications   Medication Sig     levonorgestrel (MIRENA) 20 MCG/24HR IUD 1 each by Intrauterine route once     phentermine (ADIPEX-P) 30 MG capsule Take 1 capsule (30 mg) by mouth every morning     No current facility-administered medications for this visit.     No Known Allergies    Past medical, surgical, social and family histories were reviewed and updated in EPIC.    ROS:   12 point review of systems negative other than symptoms noted below or in the HPI.  No urinary frequency or dysuria, bladder or kidney problems    EXAM:  /70   Ht 1.702 m (5' 7\")   Wt 104.8 kg (231 lb)   BMI 36.18 kg/m     BMI: Body mass index is 36.18 kg/m .    PHYSICAL EXAM:  Constitutional:   Appearance: Well nourished, well developed, alert, in no acute distress  Neck:  Lymph Nodes:  No lymphadenopathy present    Thyroid:  Gland size normal, nontender, no nodules or masses present  on palpation  Chest:  Respiratory Effort:  Breathing unlabored, CTA " bilaterally  Cardiovascular:    Heart: Auscultation:  Regular rate, normal rhythm, no murmurs present  Breasts: Palpation of Breasts and Axillae:  No masses present on palpation, no breast tenderness., Axillary Lymph Nodes:  No lymphadenopathy present. and No nodularity, asymmetry or nipple discharge bilaterally.  Gastrointestinal:   Abdominal Examination:  Abdomen nontender to palpation, tone normal without rigidity or guarding, no masses present, umbilicus without lesions   Liver and Spleen:  No hepatomegaly present, liver nontender to palpation    Hernias:  No hernias present  Lymphatic: Lymph Nodes:  No other lymphadenopathy present  Skin:  General Inspection:  No rashes present, no lesions present, no areas of  discoloration  Neurologic:    Mental Status:  Oriented X3.  Normal strength and tone, sensory exam                grossly normal, mentation intact and speech normal.    Psychiatric:   Mentation appears normal and affect normal/bright.         Pelvic Exam:  External Genitalia:     Normal appearance for age, no discharge present, no tenderness present, no inflammatory lesions present, color normal  Vagina:    Normal vaginal vault without central or paravaginal defects, no discharge present, no inflammatory lesions present, no masses present  Bladder:     Nontender to palpation  Urethra:   Urethral Body:  Urethra palpation normal, urethra structural support normal   Urethral Meatus:  No erythema or lesions present  Cervix:     Appearance healthy, no lesions present, nontender to palpation, no bleeding present, string present  Uterus:     Nontender to palpation, no masses present, position anteflexed, mobility: normal  Adnexa:     No adnexal tenderness present, no adnexal masses present  Perineum:     Perineum within normal limits, no evidence of trauma, no rashes or skin lesions present  Anus:     Anus within normal limits, no hemorrhoids present  Inguinal Lymph Nodes:     No lymphadenopathy present  Pubic  Hair:     Normal pubic hair distribution for age  Genitalia and Groin:     No rashes present, no lesions present, no areas of discoloration, no masses present      COUNSELING:   Reviewed preventive health counseling, as reflected in patient instructions  Special attention given to:        Regular exercise       Healthy diet/nutrition       Contraception       (Rossy)menopause management    BMI: Body mass index is 36.18 kg/m .  Weight management plan: as below    ASSESSMENT:  37 year old female with satisfactory annual exam.    ICD-10-CM    1. Encounter for gynecological examination without abnormal finding  Z01.419       2. Presence of 52 mg levonorgestrel-releasing intrauterine device (IUD)  Z97.5       3. Class 2 obesity due to excess calories without serious comorbidity with body mass index (BMI) of 36.0 to 36.9 in adult  E66.09 phentermine (ADIPEX-P) 30 MG capsule    Z68.36       4. Elevated bilirubin  R17 Comprehensive metabolic panel     Comprehensive metabolic panel      5. Night sweat  R61       6. Need for hepatitis C screening test  Z11.59 Hepatitis C Screen Reflex to HCV RNA Quant and Genotype     Hepatitis C Screen Reflex to HCV RNA Quant and Genotype      7. Encounter for lipid screening for cardiovascular disease  Z13.220 Lipid panel reflex to direct LDL Fasting    Z13.6 Lipid panel reflex to direct LDL Fasting      8. Screening for metabolic disorder  Z13.228 Comprehensive metabolic panel     Comprehensive metabolic panel      9. Screening for thyroid disorder  Z13.29 TSH with free T4 reflex     TSH with free T4 reflex      10. Screening for diabetes mellitus  Z13.1 Hemoglobin A1c     Hemoglobin A1c      11. Encounter for vitamin deficiency screening  Z13.21 Vitamin D Deficiency     Vitamin D Deficiency          PLAN:  Pap is UTD for one more year    Should schedule for a mammo just after her 38th bday and then next year would try to sync up her visit with me for annual and that so can be done at same  time    Fasting labs today along with A1C and D and follow up on bili. 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  Wants  to get vas and to remove IUD but didn't consider the period benefits so knowing that she would definitely just choose to keep it regardless    Unclear if nightsweats are hormonal but definitely having some of the brain fog, word search and almost adult onset ADD issues of perimenopause  However vyvanse didn't help with weight like phentermine had and didn't feel as good on it, just not herself, so doesn't want to go back to that  Felt like phentermine gave her enough focus and energy but more impact on appetite and weight loss despite eventual plateau that would like to restart it    Reviewed pros/cons/s.e, life long need vs acute and dosing taht is FDA approved vs not.  Will restart on 30mg, despite having been on 45mg, and then will f/up in person in 3 months and sync her mammo to that.  O/w if mammo happens after her Rx would run out, then can do a phone visit with me to f/up on phentermine. Could always consider sooner bump up to the 45mg since was on it before but for now will maximize any potential effects at a slightly lower dose as off phent for at least 2 yrs or so    On the same DOS, 20 additional minutes on top of the annual, were spent on direct management of the patient's medical issues as above as well as chart review including: imaging, lab work, previous visit notes by this provider,  and other provider notes, and chart completion       Karen Ngo MD

## 2023-04-28 ENCOUNTER — OFFICE VISIT (OUTPATIENT)
Dept: OBGYN | Facility: CLINIC | Age: 38
End: 2023-04-28
Payer: COMMERCIAL

## 2023-04-28 VITALS
HEIGHT: 67 IN | WEIGHT: 231 LBS | BODY MASS INDEX: 36.26 KG/M2 | SYSTOLIC BLOOD PRESSURE: 118 MMHG | DIASTOLIC BLOOD PRESSURE: 70 MMHG

## 2023-04-28 DIAGNOSIS — Z13.228 SCREENING FOR METABOLIC DISORDER: ICD-10-CM

## 2023-04-28 DIAGNOSIS — Z01.419 ENCOUNTER FOR GYNECOLOGICAL EXAMINATION WITHOUT ABNORMAL FINDING: Primary | ICD-10-CM

## 2023-04-28 DIAGNOSIS — R61 NIGHT SWEAT: ICD-10-CM

## 2023-04-28 DIAGNOSIS — Z13.1 SCREENING FOR DIABETES MELLITUS: ICD-10-CM

## 2023-04-28 DIAGNOSIS — R17 ELEVATED BILIRUBIN: ICD-10-CM

## 2023-04-28 DIAGNOSIS — E66.09 CLASS 2 OBESITY DUE TO EXCESS CALORIES WITHOUT SERIOUS COMORBIDITY WITH BODY MASS INDEX (BMI) OF 36.0 TO 36.9 IN ADULT: ICD-10-CM

## 2023-04-28 DIAGNOSIS — Z97.5 PRESENCE OF 52 MG LEVONORGESTREL-RELEASING INTRAUTERINE DEVICE (IUD): ICD-10-CM

## 2023-04-28 DIAGNOSIS — Z13.6 ENCOUNTER FOR LIPID SCREENING FOR CARDIOVASCULAR DISEASE: ICD-10-CM

## 2023-04-28 DIAGNOSIS — E66.812 CLASS 2 OBESITY DUE TO EXCESS CALORIES WITHOUT SERIOUS COMORBIDITY WITH BODY MASS INDEX (BMI) OF 36.0 TO 36.9 IN ADULT: ICD-10-CM

## 2023-04-28 DIAGNOSIS — Z11.59 NEED FOR HEPATITIS C SCREENING TEST: ICD-10-CM

## 2023-04-28 DIAGNOSIS — Z13.21 ENCOUNTER FOR VITAMIN DEFICIENCY SCREENING: ICD-10-CM

## 2023-04-28 DIAGNOSIS — Z13.29 SCREENING FOR THYROID DISORDER: ICD-10-CM

## 2023-04-28 DIAGNOSIS — Z13.220 ENCOUNTER FOR LIPID SCREENING FOR CARDIOVASCULAR DISEASE: ICD-10-CM

## 2023-04-28 LAB
ALBUMIN SERPL BCG-MCNC: 4.4 G/DL (ref 3.5–5.2)
ALP SERPL-CCNC: 76 U/L (ref 35–104)
ALT SERPL W P-5'-P-CCNC: 18 U/L (ref 10–35)
ANION GAP SERPL CALCULATED.3IONS-SCNC: 11 MMOL/L (ref 7–15)
AST SERPL W P-5'-P-CCNC: 23 U/L (ref 10–35)
BILIRUB SERPL-MCNC: 0.9 MG/DL
BUN SERPL-MCNC: 11.1 MG/DL (ref 6–20)
CALCIUM SERPL-MCNC: 9.6 MG/DL (ref 8.6–10)
CHLORIDE SERPL-SCNC: 103 MMOL/L (ref 98–107)
CHOLEST SERPL-MCNC: 168 MG/DL
CREAT SERPL-MCNC: 0.79 MG/DL (ref 0.51–0.95)
DEPRECATED HCO3 PLAS-SCNC: 24 MMOL/L (ref 22–29)
GFR SERPL CREATININE-BSD FRML MDRD: >90 ML/MIN/1.73M2
GLUCOSE SERPL-MCNC: 86 MG/DL (ref 70–99)
HBA1C MFR BLD: 5 % (ref 0–5.6)
HDLC SERPL-MCNC: 60 MG/DL
LDLC SERPL CALC-MCNC: 93 MG/DL
NONHDLC SERPL-MCNC: 108 MG/DL
POTASSIUM SERPL-SCNC: 4.1 MMOL/L (ref 3.4–5.3)
PROT SERPL-MCNC: 7.6 G/DL (ref 6.4–8.3)
SODIUM SERPL-SCNC: 138 MMOL/L (ref 136–145)
TRIGL SERPL-MCNC: 73 MG/DL
TSH SERPL DL<=0.005 MIU/L-ACNC: 2.94 UIU/ML (ref 0.3–4.2)

## 2023-04-28 PROCEDURE — 83036 HEMOGLOBIN GLYCOSYLATED A1C: CPT | Performed by: OBSTETRICS & GYNECOLOGY

## 2023-04-28 PROCEDURE — 80061 LIPID PANEL: CPT | Performed by: OBSTETRICS & GYNECOLOGY

## 2023-04-28 PROCEDURE — 99213 OFFICE O/P EST LOW 20 MIN: CPT | Mod: 25 | Performed by: OBSTETRICS & GYNECOLOGY

## 2023-04-28 PROCEDURE — 80053 COMPREHEN METABOLIC PANEL: CPT | Performed by: OBSTETRICS & GYNECOLOGY

## 2023-04-28 PROCEDURE — 86803 HEPATITIS C AB TEST: CPT | Performed by: OBSTETRICS & GYNECOLOGY

## 2023-04-28 PROCEDURE — 84443 ASSAY THYROID STIM HORMONE: CPT | Performed by: OBSTETRICS & GYNECOLOGY

## 2023-04-28 PROCEDURE — 99395 PREV VISIT EST AGE 18-39: CPT | Performed by: OBSTETRICS & GYNECOLOGY

## 2023-04-28 PROCEDURE — 82306 VITAMIN D 25 HYDROXY: CPT | Performed by: OBSTETRICS & GYNECOLOGY

## 2023-04-28 PROCEDURE — 36415 COLL VENOUS BLD VENIPUNCTURE: CPT | Performed by: OBSTETRICS & GYNECOLOGY

## 2023-04-28 RX ORDER — PHENTERMINE HYDROCHLORIDE 30 MG/1
30 CAPSULE ORAL EVERY MORNING
Qty: 90 CAPSULE | Refills: 0 | Status: SHIPPED | OUTPATIENT
Start: 2023-04-28 | End: 2023-08-02

## 2023-04-28 ASSESSMENT — ANXIETY QUESTIONNAIRES
2. NOT BEING ABLE TO STOP OR CONTROL WORRYING: NOT AT ALL
3. WORRYING TOO MUCH ABOUT DIFFERENT THINGS: NOT AT ALL
GAD7 TOTAL SCORE: 0
GAD7 TOTAL SCORE: 0
1. FEELING NERVOUS, ANXIOUS, OR ON EDGE: NOT AT ALL
6. BECOMING EASILY ANNOYED OR IRRITABLE: NOT AT ALL
5. BEING SO RESTLESS THAT IT IS HARD TO SIT STILL: NOT AT ALL
7. FEELING AFRAID AS IF SOMETHING AWFUL MIGHT HAPPEN: NOT AT ALL

## 2023-04-28 ASSESSMENT — PATIENT HEALTH QUESTIONNAIRE - PHQ9
5. POOR APPETITE OR OVEREATING: NOT AT ALL
SUM OF ALL RESPONSES TO PHQ QUESTIONS 1-9: 0

## 2023-04-29 LAB
DEPRECATED CALCIDIOL+CALCIFEROL SERPL-MC: 33 UG/L (ref 20–75)
HCV AB SERPL QL IA: NONREACTIVE

## 2023-04-30 NOTE — RESULT ENCOUNTER NOTE
Marivel,    All of your labs look great. The vitamin D is definitely better than it's been at other times. Still could be just a bit higher with an ideal range of 40-50, but nothing overly concerning. I typically recommend that everyone buy over the counter D3 at 2000 international unit(s) and take one a day every day to help maintain levels, especially through winter months.    The CDC also recommends a once in adulthood hepatitis C test, so we did that as well, and as expected it's normal/negative.    Karen Ngo MD

## 2023-05-11 ENCOUNTER — ANCILLARY ORDERS (OUTPATIENT)
Dept: OBGYN | Facility: CLINIC | Age: 38
End: 2023-05-11

## 2023-05-11 DIAGNOSIS — Z12.31 VISIT FOR SCREENING MAMMOGRAM: ICD-10-CM

## 2023-08-01 NOTE — PROGRESS NOTES
"  SUBJECTIVE:                                                   Marivel Jones is a 38 year old female who presents to clinic today for the following health issue(s):  Patient presents with:  Medication Follow-up      Additional information: not helping much     HPI:    Pt presents for a medication follow up.     Pt reports Phentermine is not generating desired weight loss since restarting back on it .  Had been on 45mg a couple years back before stopping it to have coli. So when restarted didn't want to go straight to 45mg as off for a while so went back on 30mg.     The first couple days she experienced a reduced appetite and smaller portions and maybe slight increase in energy/motivation but within days that all basically stopped and really not feeling much of anything at this point. Knew she have less effect the second time around but dissapointed that really none.    Was given higher heigh the last several visits of 5'8\" b/c was in shoes then but was never 5'8\" so today's height of just under 5'7\" is real. Was 231# in April and now 233#      Pt does not currently have a blood pressure cuff at home so hasn't checked it. Didn't actually take her phentermine today so normal BP is w/o medication on board. No s.e at all though in terms of heart racing or anxiety or insomnia, etc.    After hit phent plateau we did try vyvanse at 40mg  then no resuls to bumped to 70mg  but had HA, sleep disturbances, and was generally not effective for her.       No LMP recorded. (Menstrual status: IUD)..     Patient is sexually active, .  Using IUD for contraception.    reports that she has never smoked. She has never used smokeless tobacco.    STD testing offered?  Declined    Health maintenance updated:  yes    Today's PHQ-2 Score:       2023     8:34 AM   PHQ-2 (  [employer])   Q1: Little interest or pleasure in doing things 0   Q2: Feeling down, depressed or hopeless 0   PHQ-2 Score 0 " "    Today's PHQ-9 Score:       4/28/2023     8:34 AM   PHQ-9 SCORE   PHQ-9 Total Score 0     Today's NATACHA-7 Score:       4/28/2023     8:34 AM   NATACHA-7 SCORE   Total Score 0       Problem list and histories reviewed & adjusted, as indicated.  Additional history: as documented.    Patient Active Problem List   Diagnosis    Vitamin D deficiency    Presence of intrauterine contraceptive device    Subclinical hypothyroidism    Presence of 52 mg levonorgestrel-releasing intrauterine device (IUD)    Elevated bilirubin    Night sweat     Past Surgical History:   Procedure Laterality Date    wisdom teeth  2012      Social History     Tobacco Use    Smoking status: Never    Smokeless tobacco: Never   Substance Use Topics    Alcohol use: Yes     Alcohol/week: 0.0 standard drinks of alcohol     Comment: Occas       Problem (# of Occurrences) Relation (Name,Age of Onset)    Thyroid Disease (1) Maternal Grandmother    Other Cancer (1) Maternal Grandmother    No Known Problems (7) Mother, Father, Sister, Brother, Maternal Grandfather, Paternal Grandmother, Other              Current Outpatient Medications   Medication Sig    levonorgestrel (MIRENA) 20 MCG/24HR IUD 1 each by Intrauterine route once    phentermine (ADIPEX-P) 30 MG capsule Take 1 capsule (30 mg) by mouth every morning     No current facility-administered medications for this visit.     No Known Allergies    ROS:  12 point review of systems negative other than symptoms noted below or in the HPI.  No urinary frequency or dysuria, bladder or kidney problems, Normal menstrual cycles      OBJECTIVE:     /76   Ht 1.657 m (5' 5.25\")   Wt 105.8 kg (233 lb 3.2 oz)   Breastfeeding No   BMI 38.51 kg/m    Body mass index is 38.51 kg/m .    Exam:  Constitutional:  Appearance: Well nourished, well developed alert, in no acute distress  Neurologic:  Mental Status:  Oriented X3.  Normal strength and tone, sensory exam grossly normal, mentation intact and speech normal.  "   Psychiatric:  Mentation appears normal and affect normal/bright.     In-Clinic Test Results:  No results found for this or any previous visit (from the past 24 hour(s)).    ASSESSMENT/PLAN:                                                        ICD-10-CM    1. Class 2 obesity due to excess calories with body mass index (BMI) of 38.0 to 38.9 in adult, unspecified whether serious comorbidity present  E66.09     Z68.38       2. High risk medication use  Z79.899               Reviewed the expectations with stimulant therapy and the knowledge that it is always less effective subsequent times of using it and she knew that.  Also started on a lower dose than what she'd left off on back in 2021/2022 b/c didn't want to have too many s.e after that long of a time off, so 30mg not being effective isn't unexpected but also very minimal effect makes me suspect that 45mg is likely also not going to be effective enough, but hopefully better than 30mg.    Rx for 30mg and 15mg phentermine sent for #90 and then one additional #90 and then will f/up with me half way between now and her April annual, so December/January    However, also discussed GLP-1 agonists and mechanism of action and that they target the actual cause of obesity, metabolic rate, hunger drive, etc  This is an issue for many and especially after age 40 and think that this is what is the key to overall long term successful weight loss  Discussed Mounjaro and Wegovye, insurance coverage requirements, health requirements, pros/risks/s.e.   Referred her to PCP to see if they would do one of those medications for her.  She will look into them and consider it and if does that can discuss pros/cons of staying on the phent just for energy/brain fog issues alone vs only the GLP-1  Until then will stick with the 45mg phentermine and work on dietary changes and exercise.     Discussed the expectations/pros/insurance coverage of 3D mammo as she is having her first mammo today       24 minutes in addition to the routine annual preventative exam,  were spent on direct management of the patient's other medical issues as above as well as chart review including: imaging, lab work, previous visit notes by this provider, and other provider notes, as well as chart completion on the same DOS      Karen Ngo MD  [facility] [place]

## 2023-08-02 ENCOUNTER — ANCILLARY ORDERS (OUTPATIENT)
Dept: OBGYN | Facility: CLINIC | Age: 38
End: 2023-08-02

## 2023-08-02 ENCOUNTER — OFFICE VISIT (OUTPATIENT)
Dept: OBGYN | Facility: CLINIC | Age: 38
End: 2023-08-02
Payer: COMMERCIAL

## 2023-08-02 ENCOUNTER — ANCILLARY PROCEDURE (OUTPATIENT)
Dept: MAMMOGRAPHY | Facility: CLINIC | Age: 38
End: 2023-08-02
Payer: COMMERCIAL

## 2023-08-02 VITALS
SYSTOLIC BLOOD PRESSURE: 100 MMHG | DIASTOLIC BLOOD PRESSURE: 76 MMHG | BODY MASS INDEX: 38.85 KG/M2 | HEIGHT: 65 IN | WEIGHT: 233.2 LBS

## 2023-08-02 DIAGNOSIS — E66.812 CLASS 2 OBESITY DUE TO EXCESS CALORIES WITH BODY MASS INDEX (BMI) OF 38.0 TO 38.9 IN ADULT, UNSPECIFIED WHETHER SERIOUS COMORBIDITY PRESENT: Primary | ICD-10-CM

## 2023-08-02 DIAGNOSIS — Z79.899 HIGH RISK MEDICATION USE: ICD-10-CM

## 2023-08-02 DIAGNOSIS — E66.812 CLASS 2 OBESITY DUE TO EXCESS CALORIES WITHOUT SERIOUS COMORBIDITY WITH BODY MASS INDEX (BMI) OF 36.0 TO 36.9 IN ADULT: ICD-10-CM

## 2023-08-02 DIAGNOSIS — Z12.31 VISIT FOR SCREENING MAMMOGRAM: ICD-10-CM

## 2023-08-02 DIAGNOSIS — E66.09 CLASS 2 OBESITY DUE TO EXCESS CALORIES WITHOUT SERIOUS COMORBIDITY WITH BODY MASS INDEX (BMI) OF 36.0 TO 36.9 IN ADULT: ICD-10-CM

## 2023-08-02 DIAGNOSIS — E66.09 CLASS 2 OBESITY DUE TO EXCESS CALORIES WITH BODY MASS INDEX (BMI) OF 38.0 TO 38.9 IN ADULT, UNSPECIFIED WHETHER SERIOUS COMORBIDITY PRESENT: Primary | ICD-10-CM

## 2023-08-02 PROCEDURE — 99213 OFFICE O/P EST LOW 20 MIN: CPT | Performed by: OBSTETRICS & GYNECOLOGY

## 2023-08-02 PROCEDURE — 77063 BREAST TOMOSYNTHESIS BI: CPT | Mod: TC | Performed by: RADIOLOGY

## 2023-08-02 PROCEDURE — 77067 SCR MAMMO BI INCL CAD: CPT | Mod: TC | Performed by: RADIOLOGY

## 2023-08-02 RX ORDER — PHENTERMINE HYDROCHLORIDE 15 MG/1
CAPSULE ORAL
Qty: 90 CAPSULE | Refills: 1 | Status: SHIPPED | OUTPATIENT
Start: 2023-08-02 | End: 2024-08-05

## 2023-08-02 RX ORDER — PHENTERMINE HYDROCHLORIDE 30 MG/1
30 CAPSULE ORAL EVERY MORNING
Qty: 90 CAPSULE | Refills: 1 | Status: SHIPPED | OUTPATIENT
Start: 2023-08-02 | End: 2024-08-05

## 2024-06-23 ENCOUNTER — HEALTH MAINTENANCE LETTER (OUTPATIENT)
Age: 39
End: 2024-06-23

## 2024-08-02 NOTE — PROGRESS NOTES
Marivel is a 39 year old  female who presents for annual exam.     Besides routine health maintenance, she has no other health concerns today .    HPI:  The patient's PCP is Dr. Karen Ngo MD.      Patient is here for her annual. Her mirena has been in for 5  yrs now and just in the last 2 maybe 3 months has had a monthly period. Only 2 days and fairly light, but more than just spotting. Prior to that really had nothing more than a one time or 1/2 day of tan/brown spotting and rarely. Does have night sweats but has for many years. Doesn't seem cyclic but hasn't paid much attention to it, however seems like more nights than not. Tolerable and long standing when even in her early 30's.    Patient has done phentermine through me, on/off for years. Tried vyvanse but not as effective. Has formal vs informal dx of ADHD-I or at least very much feels that she has sx cw it.    Was in  last and we did phentermine 30 and added 15mg b/c felt like really plateau'd and wasn't nearly as effective as 30mg had been in past times of taking it. Did that for a couple of months, took all 45mg in the AM together, but then had worse insomnia. Falling asleep fine but not deep sleep and early morning awakenings with it, so stopped it completely and sleep did improve, but of course then had weight gain. At one point initially on phentermine had gotten to 167# and a BMI of 26. This was back in early . She then went back up to 233# dna BMI of 38 when here last in 2023.    Has been working really hard at just diet and exercise and has lost some weight with that. Currently 222# and a BMI of 35. However much harder to lose and definitely had benefit from the phentermine just in terms of focus/attention and energy. Does think she'd like to restart on it but only at 30mg, especially now that back to committedly working out and trying to eat right.    Normal fasting labs last year and not fasting today to repeat. Has had  high bili in the past, back to  but normal on several last checks.      GYNECOLOGIC HISTORY:    Patient's last menstrual period was 2024 (approximate).    Regular menses? Past few months having cycles but not heavy bleeding     Her current contraception method is: IUD.  She  reports that she has never smoked. She has never used smokeless tobacco.    Patient is sexually active.  STD testing offered?  Declined  Last PHQ-9 score on record =       2023     8:34 AM   PHQ-9 SCORE   PHQ-9 Total Score 0     Last GAD7 score on record =       2023     8:34 AM   NATACHA-7 SCORE   Total Score 0     Alcohol Score =     HEALTH MAINTENANCE:  Cholesterol:   Recent Labs   Lab Test 23  0924 20  1618   CHOL 168 138   HDL 60 56   LDL 93 71   TRIG 73 56     Last Mammo: One year ago, Result: Normal, Next Mammo: Today     Pap:   Lab Results   Component Value Date    PAP NIL 2019     Colonoscopy:  NA, Result: Not applicable, Next Colonoscopy: 45 years.  Dexa:  NA    Health maintenance updated:  Yes    HISTORY:  OB History    Para Term  AB Living   3 3 3 0 0 3   SAB IAB Ectopic Multiple Live Births   0 0 0 0 3      # Outcome Date GA Lbr Efren/2nd Weight Sex Type Anes PTL Lv   3 Term 19 37w0d 04:20  00:18 2.985 kg (6 lb 9.3 oz) F Vag-Spont EPI N CHANDNI      Birth Comments: followed and delivered by [facility]. PROM with pit aug, pushed one time, 1st degree lac repaired      Name: Daxa      Apgar1: 9  Apgar5: 9   2 Term 13 39w3d 02:20 / 01:00 4.04 kg (8 lb 14.5 oz) M Vag-Spont EPI  CHANDNI      Birth Comments: followed and delivered by [facility]. elective indux. presume macrosomia and advanced cvx dilation. done at 39+3. labor was 2 hr 20min but actively pushed for an hour      Name: Case      Apgar1: 9  Apgar5: 9   1 Term 11 39w0d  3.827 kg (8 lb 7 oz) M Vag-Vacuum EPI  CHANDNI      Birth Comments: Followed and delivered by [facility]. Elective indux with favorable cvx and presumed macrosomia.  "Vac done for exhaustion and arrest @+2. 2 pulls and no popoffs, long second stage over 4 hrs      Name: Pina       Patient Active Problem List   Diagnosis    Vitamin D deficiency    Subclinical hypothyroidism    Presence of 52 mg levonorgestrel-releasing intrauterine device (IUD)    Night sweat     Past Surgical History:   Procedure Laterality Date    wisdom teeth  2012      Social History     Tobacco Use    Smoking status: Never    Smokeless tobacco: Never   Substance Use Topics    Alcohol use: Yes     Alcohol/week: 0.0 standard drinks of alcohol     Comment: Occas       Problem (# of Occurrences) Relation (Name,Age of Onset)    Thyroid Disease (1) Maternal Grandmother    Other Cancer (1) Maternal Grandmother    No Known Problems (7) Mother, Father, Sister, Brother, Maternal Grandfather, Paternal Grandmother, Other              Current Outpatient Medications   Medication Sig Dispense Refill    phentermine 30 MG capsule Take 1 capsule (30 mg) by mouth every morning 90 capsule 1     No current facility-administered medications for this visit.     No Known Allergies    Past medical, surgical, social and family histories were reviewed and updated in [facility].    EXAM:  /60   Ht 1.689 m (5' 6.5\")   Wt 101 kg (222 lb 9.6 oz)   LMP 07/12/2024 (Approximate)   Breastfeeding No   BMI 35.39 kg/m     BMI: Body mass index is 35.39 kg/m .    PHYSICAL EXAM:  Constitutional:   Appearance: Well nourished, well developed, alert, in no acute distress  Neck:  Lymph Nodes:  No lymphadenopathy present    Thyroid:  Gland size normal, nontender, no nodules or masses present  on palpation  Chest:  Respiratory Effort:  Breathing unlabored, CTA bilaterally  Cardiovascular:    Heart: Auscultation:  Regular rate, normal rhythm, no murmurs present  Breasts: Axillary Lymph Nodes:  No lymphadenopathy present. and No nodularity, asymmetry or nipple discharge bilaterally.  Gastrointestinal:   Abdominal Examination:  Abdomen nontender to " palpation, tone normal without rigidity or guarding, no masses present, umbilicus without lesions   Liver and Spleen:  No hepatomegaly present, liver nontender to palpation    Hernias:  No hernias present  Lymphatic: Lymph Nodes:  No other lymphadenopathy present  Skin:  General Inspection:  No rashes present, no lesions present, no areas of  discoloration  Neurologic:    Mental Status:  Oriented X3.  Normal strength and tone, sensory exam                grossly normal, mentation intact and speech normal.    Psychiatric:   Mentation appears normal and affect normal/bright.         Pelvic Exam:  External Genitalia:     Normal appearance for age, no discharge present, no tenderness present, no inflammatory lesions present, color normal  Vagina:    Normal vaginal vault without central or paravaginal defects, no discharge present, no inflammatory lesions present, no masses present  Bladder:     Nontender to palpation  Urethra:   Urethral Body:  Urethra palpation normal, urethra structural support normal   Urethral Meatus:  No erythema or lesions present  Cervix:     Appearance healthy, no lesions present, nontender to palpation, no bleeding present, string present  Uterus:     Nontender to palpation, no masses present, position anteflexed, mobility: normal  Adnexa:     No adnexal tenderness present, no adnexal masses present  Perineum:     Perineum within normal limits, no evidence of trauma, no rashes or skin lesions present  Anus:     Anus within normal limits, no hemorrhoids present  Inguinal Lymph Nodes:     No lymphadenopathy present  Pubic Hair:     Normal pubic hair distribution for age  Genitalia and Groin:     No rashes present, no lesions present, no areas of discoloration, no masses present    COUNSELING:   Reviewed preventive health counseling, as reflected in patient instructions  Special attention given to:        Regular exercise       Healthy diet/nutrition    BMI: Body mass index is 35.39 kg/m .  Weight  management plan: Discussed healthy diet and exercise guidelines    ASSESSMENT:  39 year old female with satisfactory annual exam.    ICD-10-CM    1. Encounter for gynecological examination without abnormal finding  Z01.419 Gynecologic Cytology (Pap) and HPV - Recommended Age 30-65 Years     Gynecologic Cytology (PAP)      2. Presence of 52 mg levonorgestrel-releasing intrauterine device (IUD)  Z97.5       3. Subclinical hypothyroidism  E03.8       4. Night sweat  R61       5. Obesity, Class II, BMI 35-39.9, isolated (see actual BMI)  E66.9 phentermine 30 MG capsule      6. Immunity status testing  Z01.84           PLAN:  Pap done today  Can follow routine ASCCP guidelines     Mammo today    Fasting labs deferred this year and should repeat every 3 yrs or so.     The patient is showing that she is due for Hepatitis B vaccination. Will do antibody testing when does need hormones to see if showing immunity or not and then based on that will recommend if Hepatitis B vaccination is actually needed or not.  Will address any abnormalities once results are back.      Additional health issues addressed at today's visit include:    Reviewed her IUD and FDA approval for an additional 3 yrs and then will swap for a new one. Reviewed typical expectation for bleeding patterns as well as changes after age 40 in terms of perimenopausal sx, etc  For now is not having any of that, other than her baseline long standing night sweats, and manageable, so no interventions needed.    Today, extensive discussion was had on weight, exercise, calorie intake and output, basal metabolic rates and changes with aging.   Discussed regular exercise which she is currently doing and muscle confusion as well as digestive confusion with dietary changes.   Discussed that fatigue and focus and motivation are all impacted by aging and hormonal decline especially if there is an actual ADHD-I dx.  Discussed formal eval for that if she is interested in  it.    Discussed medication adjuncts to lifestyle modifications such as phentermine and vyvanse for binge eating but mostly GLP-1 medications that technically she is approved for based on FDA guidelines but insurance coverage is unlikely  Reviewed ways to get GLP-1 medications through compounding pharmacies like online programs and [facility] [facility] and she may look in to that.    For now will restart her phentermine 30mg and sent in #90 with 1 RF and then will do a f/up visit in 6 months to continue on that    14 minutes in addition to the routine annual preventative exam,  were spent on direct management of the patient's other medical issues as above as well as chart review including: imaging, lab work, previous visit notes by this provider, and other provider notes, as well as chart completion on the same DOS      Karen Ngo MD

## 2024-08-05 ENCOUNTER — ANCILLARY PROCEDURE (OUTPATIENT)
Dept: MAMMOGRAPHY | Facility: CLINIC | Age: 39
End: 2024-08-05
Payer: COMMERCIAL

## 2024-08-05 ENCOUNTER — OFFICE VISIT (OUTPATIENT)
Dept: OBGYN | Facility: CLINIC | Age: 39
End: 2024-08-05
Payer: COMMERCIAL

## 2024-08-05 VITALS
WEIGHT: 222.6 LBS | BODY MASS INDEX: 34.94 KG/M2 | DIASTOLIC BLOOD PRESSURE: 60 MMHG | SYSTOLIC BLOOD PRESSURE: 112 MMHG | HEIGHT: 67 IN

## 2024-08-05 DIAGNOSIS — E03.8 SUBCLINICAL HYPOTHYROIDISM: ICD-10-CM

## 2024-08-05 DIAGNOSIS — E66.812 OBESITY, CLASS II, BMI 35-39.9, ISOLATED (SEE ACTUAL BMI): ICD-10-CM

## 2024-08-05 DIAGNOSIS — Z12.31 VISIT FOR SCREENING MAMMOGRAM: ICD-10-CM

## 2024-08-05 DIAGNOSIS — Z01.419 ENCOUNTER FOR GYNECOLOGICAL EXAMINATION WITHOUT ABNORMAL FINDING: Primary | ICD-10-CM

## 2024-08-05 DIAGNOSIS — Z01.84 IMMUNITY STATUS TESTING: ICD-10-CM

## 2024-08-05 DIAGNOSIS — Z97.5 PRESENCE OF 52 MG LEVONORGESTREL-RELEASING INTRAUTERINE DEVICE (IUD): ICD-10-CM

## 2024-08-05 DIAGNOSIS — R61 NIGHT SWEAT: ICD-10-CM

## 2024-08-05 PROBLEM — R17 ELEVATED BILIRUBIN: Status: RESOLVED | Noted: 2023-04-28 | Resolved: 2024-08-05

## 2024-08-05 PROCEDURE — 77067 SCR MAMMO BI INCL CAD: CPT | Mod: TC | Performed by: RADIOLOGY

## 2024-08-05 PROCEDURE — 77063 BREAST TOMOSYNTHESIS BI: CPT | Mod: TC | Performed by: RADIOLOGY

## 2024-08-05 PROCEDURE — G0145 SCR C/V CYTO,THINLAYER,RESCR: HCPCS | Performed by: OBSTETRICS & GYNECOLOGY

## 2024-08-05 PROCEDURE — 87624 HPV HI-RISK TYP POOLED RSLT: CPT | Performed by: OBSTETRICS & GYNECOLOGY

## 2024-08-05 PROCEDURE — 99459 PELVIC EXAMINATION: CPT | Performed by: OBSTETRICS & GYNECOLOGY

## 2024-08-05 PROCEDURE — 99214 OFFICE O/P EST MOD 30 MIN: CPT | Mod: 25 | Performed by: OBSTETRICS & GYNECOLOGY

## 2024-08-05 PROCEDURE — 99396 PREV VISIT EST AGE 40-64: CPT | Performed by: OBSTETRICS & GYNECOLOGY

## 2024-08-05 RX ORDER — PHENTERMINE HYDROCHLORIDE 30 MG/1
30 CAPSULE ORAL EVERY MORNING
Qty: 90 CAPSULE | Refills: 1 | Status: SHIPPED | OUTPATIENT
Start: 2024-08-05

## 2024-08-05 RX ORDER — FLUTICASONE PROPIONATE 50 MCG
2 SPRAY, SUSPENSION (ML) NASAL DAILY
COMMUNITY
Start: 2024-01-14 | End: 2024-08-05

## 2024-08-07 LAB
HPV HR 12 DNA CVX QL NAA+PROBE: NEGATIVE
HPV16 DNA CVX QL NAA+PROBE: NEGATIVE
HPV18 DNA CVX QL NAA+PROBE: NEGATIVE
HUMAN PAPILLOMA VIRUS FINAL DIAGNOSIS: NORMAL

## 2024-08-08 LAB
BKR LAB AP GYN ADEQUACY: NORMAL
BKR LAB AP GYN INTERPRETATION: NORMAL
BKR LAB AP PREVIOUS ABNORMAL: NORMAL
PATH REPORT.COMMENTS IMP SPEC: NORMAL
PATH REPORT.COMMENTS IMP SPEC: NORMAL
PATH REPORT.RELEVANT HX SPEC: NORMAL

## 2025-08-13 ENCOUNTER — ANCILLARY PROCEDURE (OUTPATIENT)
Dept: MAMMOGRAPHY | Facility: CLINIC | Age: 40
End: 2025-08-13
Payer: COMMERCIAL

## 2025-08-13 DIAGNOSIS — Z12.31 VISIT FOR SCREENING MAMMOGRAM: ICD-10-CM

## 2025-08-13 PROCEDURE — 77063 BREAST TOMOSYNTHESIS BI: CPT | Mod: TC | Performed by: RADIOLOGY

## 2025-08-13 PROCEDURE — 77067 SCR MAMMO BI INCL CAD: CPT | Mod: TC | Performed by: RADIOLOGY
